# Patient Record
Sex: FEMALE | Race: BLACK OR AFRICAN AMERICAN | NOT HISPANIC OR LATINO | Employment: FULL TIME | URBAN - METROPOLITAN AREA
[De-identification: names, ages, dates, MRNs, and addresses within clinical notes are randomized per-mention and may not be internally consistent; named-entity substitution may affect disease eponyms.]

---

## 2021-06-26 ENCOUNTER — HOSPITAL ENCOUNTER (EMERGENCY)
Facility: HOSPITAL | Age: 31
Discharge: HOME | End: 2021-06-26
Attending: EMERGENCY MEDICINE | Admitting: EMERGENCY MEDICINE

## 2021-06-26 VITALS
OXYGEN SATURATION: 99 % | HEART RATE: 94 BPM | DIASTOLIC BLOOD PRESSURE: 101 MMHG | SYSTOLIC BLOOD PRESSURE: 146 MMHG | RESPIRATION RATE: 16 BRPM | TEMPERATURE: 97.9 F

## 2021-06-26 DIAGNOSIS — L73.2 HYDRADENITIS: ICD-10-CM

## 2021-06-26 DIAGNOSIS — S31.809A WOUND OF GLUTEAL CLEFT, UNSPECIFIED LATERALITY, INITIAL ENCOUNTER: Primary | ICD-10-CM

## 2021-06-26 PROCEDURE — 99282 EMERGENCY DEPT VISIT SF MDM: CPT

## 2021-06-26 RX ORDER — MEDROXYPROGESTERONE ACETATE 150 MG/ML
150 INJECTION, SUSPENSION INTRAMUSCULAR
COMMUNITY
End: 2022-07-08

## 2021-08-10 ENCOUNTER — HOSPITAL ENCOUNTER (EMERGENCY)
Facility: HOSPITAL | Age: 31
Discharge: HOME | End: 2021-08-10
Attending: EMERGENCY MEDICINE | Admitting: EMERGENCY MEDICINE

## 2021-08-10 VITALS
SYSTOLIC BLOOD PRESSURE: 155 MMHG | OXYGEN SATURATION: 99 % | WEIGHT: 150 LBS | TEMPERATURE: 97.1 F | RESPIRATION RATE: 16 BRPM | HEART RATE: 97 BPM | DIASTOLIC BLOOD PRESSURE: 107 MMHG | BODY MASS INDEX: 25.61 KG/M2 | HEIGHT: 64 IN

## 2021-08-10 DIAGNOSIS — Z20.2 EXPOSURE TO SYPHILIS: Primary | ICD-10-CM

## 2021-08-10 DIAGNOSIS — L98.9 SKIN LESION: ICD-10-CM

## 2021-08-10 LAB — RPR SER QL: NORMAL

## 2021-08-10 PROCEDURE — 96372 THER/PROPH/DIAG INJ SC/IM: CPT

## 2021-08-10 PROCEDURE — 25010000002 PENICILLIN G BENZATHINE PER 2400000 UNITS: Performed by: PHYSICIAN ASSISTANT

## 2021-08-10 PROCEDURE — 87491 CHLMYD TRACH DNA AMP PROBE: CPT | Performed by: PHYSICIAN ASSISTANT

## 2021-08-10 PROCEDURE — 86592 SYPHILIS TEST NON-TREP QUAL: CPT | Performed by: PHYSICIAN ASSISTANT

## 2021-08-10 PROCEDURE — 87591 N.GONORRHOEAE DNA AMP PROB: CPT | Performed by: PHYSICIAN ASSISTANT

## 2021-08-10 PROCEDURE — 36415 COLL VENOUS BLD VENIPUNCTURE: CPT | Performed by: PHYSICIAN ASSISTANT

## 2021-08-10 PROCEDURE — 99283 EMERGENCY DEPT VISIT LOW MDM: CPT

## 2021-08-10 RX ADMIN — PENICILLIN G BENZATHINE 2.4 MILLION UNITS: 2400000 INJECTION, SUSPENSION INTRAMUSCULAR at 13:34

## 2021-08-10 NOTE — ED TRIAGE NOTES
Pt wants to be tested for std.  She does not have discharge but reports she has a sore    Patient was placed in face mask during first look triage.  Patient was wearing a face mask throughout encounter.  I wore personal protective equipment throughout the encounter.  Hand hygiene was performed before and after patient encounter.

## 2021-08-10 NOTE — ED PROVIDER NOTES
EMERGENCY DEPARTMENT ENCOUNTER    Room Number:  A01/01  Date of encounter:  8/10/2021  PCP: Provider, No Known  Historian: Patient      I used full protective equipment while examining this patient.  This includes face mask, gloves and protective eyewear.  I washed my hands before entering the room and immediately upon leaving the room      HPI:  Chief Complaint: STD exposure, skin lesion  A complete HPI/ROS/PMH/PSH/SH/FH are unobtainable due to: Nothing    Context: Uri Mirza is a 31 y.o. female who presents to the ED c/o several day history of skin lesions to gluteal cleft.  Patient was also told by her recent sexual partner that he was diagnosed with syphilis.  Patient is a female who has had a sex change, is now a male.  She denies any fevers, chills, abdominal pain.  She denies any vaginal discharge or lesions.  Patient denies any trauma to the area.  She denies any discharge, swelling.  She describes a mild, irritation-like pain to the superior gluteal cleft.    Review of Medical Records  I reviewed ER visit from 6/26/2021.  Patient seen for skin lesions of gluteal cleft.    PAST MEDICAL HISTORY  Active Ambulatory Problems     Diagnosis Date Noted   • No Active Ambulatory Problems     Resolved Ambulatory Problems     Diagnosis Date Noted   • No Resolved Ambulatory Problems     No Additional Past Medical History         PAST SURGICAL HISTORY  No past surgical history on file.      FAMILY HISTORY  No family history on file.      SOCIAL HISTORY  Social History     Socioeconomic History   • Marital status: Single     Spouse name: Not on file   • Number of children: Not on file   • Years of education: Not on file   • Highest education level: Not on file         ALLERGIES  Banana and Mushroom        REVIEW OF SYSTEMS  All systems reviewed and negative except for those discussed in HPI.       PHYSICAL EXAM    I have reviewed the triage vital signs and nursing notes.    ED Triage Vitals   Temp Heart Rate Resp BP  SpO2   08/10/21 1145 08/10/21 1145 08/10/21 1145 08/10/21 1146 08/10/21 1145   97.1 °F (36.2 °C) 110 16 (!) 155/107 99 %      Temp src Heart Rate Source Patient Position BP Location FiO2 (%)   08/10/21 1145 08/10/21 1145 -- -- --   Tympanic Monitor          Physical Exam  GENERAL: Alert, oriented, not distressed  HENT: head atraumatic, no nuchal rigidity  EYES: no scleral icterus, EOMI  CV: regular rhythm, regular rate, no murmur  RESPIRATORY: normal effort, CTA  ABDOMEN: soft, nontender  MUSCULOSKELETAL: no deformity, FROM, no calf swelling or tenderness  NEURO: alert, moves all extremities, follows commands  SKIN: 2 discrete areas of approximate 1.5 cm areas of skin erosion to the superior gluteal cleft.  No underlying induration, swelling, discharge.        LAB RESULTS  Recent Results (from the past 24 hour(s))   RPR    Collection Time: 08/10/21 12:04 PM    Specimen: Blood   Result Value Ref Range    RPR Non-Reactive Non-Reactive       Ordered the above labs and independently reviewed the results.    MEDICATIONS GIVEN IN ER    Medications   penicillin G benzathine (BICILLIN-LA) injection 2.4 Million Units (2.4 Million Units Intramuscular Given 8/10/21 1334)         PROGRESS, DATA ANALYSIS, CONSULTS, AND MEDICAL DECISION MAKING    All labs have been independently reviewed by me.  All radiology studies have been reviewed by me and discussed with radiologist dictating the report.   EKG's independently viewed and interpreted by me.  Discussion below represents my analysis of pertinent findings related to patient's condition, differential diagnosis, treatment plan and final disposition.    I have discussed case with Dr. Pina, emergency room physician.  He has performed his own bedside examination and agrees with treatment plan.    ED Course as of Aug 10 2026   Tue Aug 10, 2021   1308 Patient presents with several day history of sore to gluteal cleft.  Patient had recent exposure to syphilis.  She denies any vaginal  discharge, lesions.  Plan to treat for syphilis and obtain RPR.    [EE]      ED Course User Index  [EE] Osman Modi PA       AS OF 20:26 EDT VITALS:    BP - (!) 155/107  HR - 97  TEMP - 97.1 °F (36.2 °C) (Tympanic)  O2 SATS - 99%        DIAGNOSIS  Final diagnoses:   Exposure to syphilis   Skin lesion         DISPOSITION  Discharged           Osman Modi PA  08/10/21 2027

## 2021-08-10 NOTE — ED NOTES
Pt states that her significant other was diagnosed with syphilis     Kaelyn Patel, RN  08/10/21 [id number]

## 2021-08-10 NOTE — ED PROVIDER NOTES
Pt presents to the ED c/o  exposure to a STI.  Patient found out yesterday that her significant other has syphilis.  Patient is noted to nontender areas in her gluteal cleft for the past several days.  She states that they are itchy but they do not hurt.  She denies fever, chills, rash or abdominal pain.  She denies nausea or vomiting.  She denies urinary symptoms.     On exam,   Her heart is regular rate and rhythm without murmur.  Her lungs are clear to auscultation bilaterally.  Her abdomen is normoactive bowel sounds, soft, nontender nondistended.  With a female chaperone present, her rectal exam reveals 2 approximate 1.5 cm areas of skin erosion with granulation tissue.  There is no discharge and they are nontender to palpation.     Plan: I suspect that patient has primary syphilis with a chancre noted.  I agree with going ahead and testing for syphilis and treating the infection with penicillin.      Patient was placed in face mask in first look. Patient was wearing facemask when I entered the room and throughout our encounter. I wore full protective equipment throughout this patient encounter including a face mask, eye shield and gloves. Hand hygiene was performed before donning protective equipment and after removal when leaving the room.       Attestation:  The CHASIDY and I have discussed this patient's history, physical exam, and treatment plan.  I have reviewed the documentation and personally had a face to face interaction with the patient. I affirm the documentation and agree with the treatment and plan.  The attached note describes my personal findings.            Manoj Pina MD  08/10/21 0586

## 2021-08-11 LAB
C TRACH RRNA SPEC QL NAA+PROBE: NEGATIVE
N GONORRHOEA RRNA SPEC QL NAA+PROBE: NEGATIVE

## 2022-01-03 ENCOUNTER — HOSPITAL ENCOUNTER (EMERGENCY)
Facility: HOSPITAL | Age: 32
Discharge: LEFT BEFORE DISCHARGE | End: 2022-01-04

## 2022-01-03 LAB
ALBUMIN SERPL-MCNC: 3.7 G/DL (ref 3.5–5.2)
ALBUMIN/GLOB SERPL: 0.9 G/DL
ALP SERPL-CCNC: 50 U/L (ref 39–117)
ALT SERPL W P-5'-P-CCNC: 17 U/L (ref 1–33)
ANION GAP SERPL CALCULATED.3IONS-SCNC: 7.7 MMOL/L (ref 5–15)
AST SERPL-CCNC: 20 U/L (ref 1–32)
BASOPHILS # BLD AUTO: 0.02 10*3/MM3 (ref 0–0.2)
BASOPHILS NFR BLD AUTO: 0.5 % (ref 0–1.5)
BILIRUB SERPL-MCNC: <0.2 MG/DL (ref 0–1.2)
BILIRUB UR QL STRIP: NEGATIVE
BUN SERPL-MCNC: 10 MG/DL (ref 6–20)
BUN/CREAT SERPL: 14.5 (ref 7–25)
CALCIUM SPEC-SCNC: 8.9 MG/DL (ref 8.6–10.5)
CHLORIDE SERPL-SCNC: 105 MMOL/L (ref 98–107)
CLARITY UR: CLEAR
CO2 SERPL-SCNC: 28.3 MMOL/L (ref 22–29)
COLOR UR: YELLOW
CREAT SERPL-MCNC: 0.69 MG/DL (ref 0.57–1)
DEPRECATED RDW RBC AUTO: 38 FL (ref 37–54)
EOSINOPHIL # BLD AUTO: 0.41 10*3/MM3 (ref 0–0.4)
EOSINOPHIL NFR BLD AUTO: 10.1 % (ref 0.3–6.2)
ERYTHROCYTE [DISTWIDTH] IN BLOOD BY AUTOMATED COUNT: 13 % (ref 12.3–15.4)
GFR SERPL CREATININE-BSD FRML MDRD: 120 ML/MIN/1.73
GLOBULIN UR ELPH-MCNC: 4.3 GM/DL
GLUCOSE SERPL-MCNC: 112 MG/DL (ref 65–99)
GLUCOSE UR STRIP-MCNC: NEGATIVE MG/DL
HCG SERPL QL: NEGATIVE
HCT VFR BLD AUTO: 34.4 % (ref 34–46.6)
HGB BLD-MCNC: 11.3 G/DL (ref 12–15.9)
HGB UR QL STRIP.AUTO: NEGATIVE
HOLD SPECIMEN: NORMAL
IMM GRANULOCYTES # BLD AUTO: 0.02 10*3/MM3 (ref 0–0.05)
IMM GRANULOCYTES NFR BLD AUTO: 0.5 % (ref 0–0.5)
KETONES UR QL STRIP: NEGATIVE
LEUKOCYTE ESTERASE UR QL STRIP.AUTO: NEGATIVE
LIPASE SERPL-CCNC: 20 U/L (ref 13–60)
LYMPHOCYTES # BLD AUTO: 1.26 10*3/MM3 (ref 0.7–3.1)
LYMPHOCYTES NFR BLD AUTO: 31.2 % (ref 19.6–45.3)
MCH RBC QN AUTO: 26.7 PG (ref 26.6–33)
MCHC RBC AUTO-ENTMCNC: 32.8 G/DL (ref 31.5–35.7)
MCV RBC AUTO: 81.3 FL (ref 79–97)
MONOCYTES # BLD AUTO: 0.56 10*3/MM3 (ref 0.1–0.9)
MONOCYTES NFR BLD AUTO: 13.9 % (ref 5–12)
NEUTROPHILS NFR BLD AUTO: 1.77 10*3/MM3 (ref 1.7–7)
NEUTROPHILS NFR BLD AUTO: 43.8 % (ref 42.7–76)
NITRITE UR QL STRIP: NEGATIVE
NRBC BLD AUTO-RTO: 0 /100 WBC (ref 0–0.2)
PH UR STRIP.AUTO: 6 [PH] (ref 5–8)
PLATELET # BLD AUTO: 170 10*3/MM3 (ref 140–450)
PMV BLD AUTO: 11.1 FL (ref 6–12)
POTASSIUM SERPL-SCNC: 3.7 MMOL/L (ref 3.5–5.2)
PROT SERPL-MCNC: 8 G/DL (ref 6–8.5)
PROT UR QL STRIP: ABNORMAL
RBC # BLD AUTO: 4.23 10*6/MM3 (ref 3.77–5.28)
SODIUM SERPL-SCNC: 141 MMOL/L (ref 136–145)
SP GR UR STRIP: 1.02 (ref 1–1.03)
UROBILINOGEN UR QL STRIP: ABNORMAL
WBC NRBC COR # BLD: 4.04 10*3/MM3 (ref 3.4–10.8)
WHOLE BLOOD HOLD SPECIMEN: NORMAL
WHOLE BLOOD HOLD SPECIMEN: NORMAL

## 2022-01-03 PROCEDURE — 83690 ASSAY OF LIPASE: CPT

## 2022-01-03 PROCEDURE — 81003 URINALYSIS AUTO W/O SCOPE: CPT

## 2022-01-03 PROCEDURE — 84703 CHORIONIC GONADOTROPIN ASSAY: CPT

## 2022-01-03 PROCEDURE — 99211 OFF/OP EST MAY X REQ PHY/QHP: CPT

## 2022-01-03 PROCEDURE — 80053 COMPREHEN METABOLIC PANEL: CPT

## 2022-01-03 PROCEDURE — 85025 COMPLETE CBC W/AUTO DIFF WBC: CPT

## 2022-01-03 RX ORDER — SODIUM CHLORIDE 0.9 % (FLUSH) 0.9 %
10 SYRINGE (ML) INJECTION AS NEEDED
Status: DISCONTINUED | OUTPATIENT
Start: 2022-01-03 | End: 2022-01-04 | Stop reason: HOSPADM

## 2022-01-04 VITALS
TEMPERATURE: 97.7 F | SYSTOLIC BLOOD PRESSURE: 165 MMHG | DIASTOLIC BLOOD PRESSURE: 106 MMHG | HEIGHT: 64 IN | OXYGEN SATURATION: 100 % | BODY MASS INDEX: 25.75 KG/M2 | RESPIRATION RATE: 15 BRPM | HEART RATE: 93 BPM

## 2022-01-04 NOTE — ED TRIAGE NOTES
"Pt arrived to ER via pv with c/o \"cramping\". Pt states that she cramps every morning after she eats and it has been going on for 1 week.  Pt states she feels full before finishing her meal.  Pt denies seeing a doctor for this issue.   Pt also thinks she may have a hemorrhoid and wants to be seen for that as well.    Pt in mask & this RN in appropriate PPE during care.   "

## 2022-01-04 NOTE — ED NOTES
Pt reassessed, states the cramping has decreased but the pain/itching in her annal area has remained the same. Pt does not appear to be in acute distress.      Moises Verduzco, RN  01/04/22 9144

## 2022-07-08 ENCOUNTER — APPOINTMENT (OUTPATIENT)
Dept: GENERAL RADIOLOGY | Facility: HOSPITAL | Age: 32
End: 2022-07-08

## 2022-07-08 ENCOUNTER — HOSPITAL ENCOUNTER (INPATIENT)
Facility: HOSPITAL | Age: 32
LOS: 2 days | Discharge: HOME | End: 2022-07-11
Attending: EMERGENCY MEDICINE | Admitting: HOSPITALIST

## 2022-07-08 DIAGNOSIS — R91.8 LUNG INFILTRATE: ICD-10-CM

## 2022-07-08 DIAGNOSIS — R09.02 HYPOXIA: Primary | ICD-10-CM

## 2022-07-08 LAB
ALBUMIN SERPL-MCNC: 3.7 G/DL (ref 3.5–5.2)
ALBUMIN/GLOB SERPL: 0.7 G/DL
ALP SERPL-CCNC: 50 U/L (ref 39–117)
ALT SERPL W P-5'-P-CCNC: 12 U/L (ref 1–33)
ANION GAP SERPL CALCULATED.3IONS-SCNC: 13 MMOL/L (ref 5–15)
AST SERPL-CCNC: 30 U/L (ref 1–32)
BASOPHILS # BLD AUTO: 0.01 10*3/MM3 (ref 0–0.2)
BASOPHILS NFR BLD AUTO: 0.2 % (ref 0–1.5)
BILIRUB SERPL-MCNC: 0.2 MG/DL (ref 0–1.2)
BUN SERPL-MCNC: 12 MG/DL (ref 6–20)
BUN/CREAT SERPL: 14 (ref 7–25)
CALCIUM SPEC-SCNC: 8.7 MG/DL (ref 8.6–10.5)
CHLORIDE SERPL-SCNC: 100 MMOL/L (ref 98–107)
CO2 SERPL-SCNC: 24 MMOL/L (ref 22–29)
CREAT SERPL-MCNC: 0.86 MG/DL (ref 0.57–1)
D DIMER PPP FEU-MCNC: 1.7 MCGFEU/ML (ref 0–0.49)
DEPRECATED RDW RBC AUTO: 37.6 FL (ref 37–54)
EGFRCR SERPLBLD CKD-EPI 2021: 92.2 ML/MIN/1.73
EOSINOPHIL # BLD AUTO: 0.13 10*3/MM3 (ref 0–0.4)
EOSINOPHIL NFR BLD AUTO: 2.2 % (ref 0.3–6.2)
ERYTHROCYTE [DISTWIDTH] IN BLOOD BY AUTOMATED COUNT: 12.9 % (ref 12.3–15.4)
GLOBULIN UR ELPH-MCNC: 5.1 GM/DL
GLUCOSE SERPL-MCNC: 133 MG/DL (ref 65–99)
HCT VFR BLD AUTO: 32.9 % (ref 34–46.6)
HGB BLD-MCNC: 10.7 G/DL (ref 12–15.9)
IMM GRANULOCYTES # BLD AUTO: 0.07 10*3/MM3 (ref 0–0.05)
IMM GRANULOCYTES NFR BLD AUTO: 1.2 % (ref 0–0.5)
LYMPHOCYTES # BLD AUTO: 0.92 10*3/MM3 (ref 0.7–3.1)
LYMPHOCYTES NFR BLD AUTO: 15.5 % (ref 19.6–45.3)
MCH RBC QN AUTO: 26.5 PG (ref 26.6–33)
MCHC RBC AUTO-ENTMCNC: 32.5 G/DL (ref 31.5–35.7)
MCV RBC AUTO: 81.4 FL (ref 79–97)
MONOCYTES # BLD AUTO: 0.6 10*3/MM3 (ref 0.1–0.9)
MONOCYTES NFR BLD AUTO: 10.1 % (ref 5–12)
NEUTROPHILS NFR BLD AUTO: 4.21 10*3/MM3 (ref 1.7–7)
NEUTROPHILS NFR BLD AUTO: 70.8 % (ref 42.7–76)
NRBC BLD AUTO-RTO: 0 /100 WBC (ref 0–0.2)
PLATELET # BLD AUTO: 269 10*3/MM3 (ref 140–450)
PMV BLD AUTO: 10.2 FL (ref 6–12)
POTASSIUM SERPL-SCNC: 3.8 MMOL/L (ref 3.5–5.2)
PROT SERPL-MCNC: 8.8 G/DL (ref 6–8.5)
RBC # BLD AUTO: 4.04 10*6/MM3 (ref 3.77–5.28)
SODIUM SERPL-SCNC: 137 MMOL/L (ref 136–145)
WBC NRBC COR # BLD: 5.94 10*3/MM3 (ref 3.4–10.8)

## 2022-07-08 PROCEDURE — 93010 ELECTROCARDIOGRAM REPORT: CPT | Performed by: INTERNAL MEDICINE

## 2022-07-08 PROCEDURE — 71046 X-RAY EXAM CHEST 2 VIEWS: CPT

## 2022-07-08 PROCEDURE — 93005 ELECTROCARDIOGRAM TRACING: CPT

## 2022-07-08 PROCEDURE — 85025 COMPLETE CBC W/AUTO DIFF WBC: CPT

## 2022-07-08 PROCEDURE — 99285 EMERGENCY DEPT VISIT HI MDM: CPT

## 2022-07-08 PROCEDURE — U0003 INFECTIOUS AGENT DETECTION BY NUCLEIC ACID (DNA OR RNA); SEVERE ACUTE RESPIRATORY SYNDROME CORONAVIRUS 2 (SARS-COV-2) (CORONAVIRUS DISEASE [COVID-19]), AMPLIFIED PROBE TECHNIQUE, MAKING USE OF HIGH THROUGHPUT TECHNOLOGIES AS DESCRIBED BY CMS-2020-01-R: HCPCS | Performed by: EMERGENCY MEDICINE

## 2022-07-08 PROCEDURE — 84484 ASSAY OF TROPONIN QUANT: CPT | Performed by: EMERGENCY MEDICINE

## 2022-07-08 PROCEDURE — 36415 COLL VENOUS BLD VENIPUNCTURE: CPT

## 2022-07-08 PROCEDURE — 85379 FIBRIN DEGRADATION QUANT: CPT | Performed by: EMERGENCY MEDICINE

## 2022-07-08 PROCEDURE — 80053 COMPREHEN METABOLIC PANEL: CPT

## 2022-07-08 PROCEDURE — 93005 ELECTROCARDIOGRAM TRACING: CPT | Performed by: EMERGENCY MEDICINE

## 2022-07-09 ENCOUNTER — APPOINTMENT (OUTPATIENT)
Dept: CARDIOLOGY | Facility: HOSPITAL | Age: 32
End: 2022-07-09

## 2022-07-09 ENCOUNTER — APPOINTMENT (OUTPATIENT)
Dept: CT IMAGING | Facility: HOSPITAL | Age: 32
End: 2022-07-09

## 2022-07-09 PROBLEM — R79.89 ELEVATED D-DIMER: Status: ACTIVE | Noted: 2022-07-09

## 2022-07-09 PROBLEM — R06.02 SHORTNESS OF BREATH: Status: ACTIVE | Noted: 2022-07-09

## 2022-07-09 PROBLEM — R09.02 HYPOXIA: Status: ACTIVE | Noted: 2022-07-09

## 2022-07-09 LAB
ANION GAP SERPL CALCULATED.3IONS-SCNC: 16 MMOL/L (ref 5–15)
B PARAPERT DNA SPEC QL NAA+PROBE: NOT DETECTED
B PERT DNA SPEC QL NAA+PROBE: NOT DETECTED
BASOPHILS # BLD AUTO: 0.01 10*3/MM3 (ref 0–0.2)
BASOPHILS NFR BLD AUTO: 0.2 % (ref 0–1.5)
BH CV LOWER VASCULAR LEFT COMMON FEMORAL AUGMENT: NORMAL
BH CV LOWER VASCULAR LEFT COMMON FEMORAL COMPETENT: NORMAL
BH CV LOWER VASCULAR LEFT COMMON FEMORAL COMPRESS: NORMAL
BH CV LOWER VASCULAR LEFT COMMON FEMORAL PHASIC: NORMAL
BH CV LOWER VASCULAR LEFT COMMON FEMORAL SPONT: NORMAL
BH CV LOWER VASCULAR LEFT DISTAL FEMORAL COMPRESS: NORMAL
BH CV LOWER VASCULAR LEFT GASTRONEMIUS COMPRESS: NORMAL
BH CV LOWER VASCULAR LEFT GREATER SAPH AK COMPRESS: NORMAL
BH CV LOWER VASCULAR LEFT GREATER SAPH BK COMPRESS: NORMAL
BH CV LOWER VASCULAR LEFT LESSER SAPH COMPRESS: NORMAL
BH CV LOWER VASCULAR LEFT MID FEMORAL AUGMENT: NORMAL
BH CV LOWER VASCULAR LEFT MID FEMORAL COMPETENT: NORMAL
BH CV LOWER VASCULAR LEFT MID FEMORAL COMPRESS: NORMAL
BH CV LOWER VASCULAR LEFT MID FEMORAL PHASIC: NORMAL
BH CV LOWER VASCULAR LEFT MID FEMORAL SPONT: NORMAL
BH CV LOWER VASCULAR LEFT PERONEAL COMPRESS: NORMAL
BH CV LOWER VASCULAR LEFT POPLITEAL AUGMENT: NORMAL
BH CV LOWER VASCULAR LEFT POPLITEAL COMPETENT: NORMAL
BH CV LOWER VASCULAR LEFT POPLITEAL COMPRESS: NORMAL
BH CV LOWER VASCULAR LEFT POPLITEAL PHASIC: NORMAL
BH CV LOWER VASCULAR LEFT POPLITEAL SPONT: NORMAL
BH CV LOWER VASCULAR LEFT POSTERIOR TIBIAL COMPRESS: NORMAL
BH CV LOWER VASCULAR LEFT PROFUNDA FEMORAL COMPRESS: NORMAL
BH CV LOWER VASCULAR LEFT PROXIMAL FEMORAL COMPRESS: NORMAL
BH CV LOWER VASCULAR LEFT SAPHENOFEMORAL JUNCTION COMPRESS: NORMAL
BH CV LOWER VASCULAR RIGHT COMMON FEMORAL AUGMENT: NORMAL
BH CV LOWER VASCULAR RIGHT COMMON FEMORAL COMPETENT: NORMAL
BH CV LOWER VASCULAR RIGHT COMMON FEMORAL COMPRESS: NORMAL
BH CV LOWER VASCULAR RIGHT COMMON FEMORAL PHASIC: NORMAL
BH CV LOWER VASCULAR RIGHT COMMON FEMORAL SPONT: NORMAL
BH CV LOWER VASCULAR RIGHT DISTAL FEMORAL COMPRESS: NORMAL
BH CV LOWER VASCULAR RIGHT GASTRONEMIUS COMPRESS: NORMAL
BH CV LOWER VASCULAR RIGHT GREATER SAPH AK COMPRESS: NORMAL
BH CV LOWER VASCULAR RIGHT GREATER SAPH BK COMPRESS: NORMAL
BH CV LOWER VASCULAR RIGHT LESSER SAPH COMPRESS: NORMAL
BH CV LOWER VASCULAR RIGHT MID FEMORAL AUGMENT: NORMAL
BH CV LOWER VASCULAR RIGHT MID FEMORAL COMPETENT: NORMAL
BH CV LOWER VASCULAR RIGHT MID FEMORAL COMPRESS: NORMAL
BH CV LOWER VASCULAR RIGHT MID FEMORAL PHASIC: NORMAL
BH CV LOWER VASCULAR RIGHT MID FEMORAL SPONT: NORMAL
BH CV LOWER VASCULAR RIGHT PERONEAL COMPRESS: NORMAL
BH CV LOWER VASCULAR RIGHT POPLITEAL AUGMENT: NORMAL
BH CV LOWER VASCULAR RIGHT POPLITEAL COMPETENT: NORMAL
BH CV LOWER VASCULAR RIGHT POPLITEAL COMPRESS: NORMAL
BH CV LOWER VASCULAR RIGHT POPLITEAL PHASIC: NORMAL
BH CV LOWER VASCULAR RIGHT POPLITEAL SPONT: NORMAL
BH CV LOWER VASCULAR RIGHT POSTERIOR TIBIAL COMPRESS: NORMAL
BH CV LOWER VASCULAR RIGHT PROFUNDA FEMORAL COMPRESS: NORMAL
BH CV LOWER VASCULAR RIGHT PROXIMAL FEMORAL COMPRESS: NORMAL
BH CV LOWER VASCULAR RIGHT SAPHENOFEMORAL JUNCTION COMPRESS: NORMAL
BUN SERPL-MCNC: 9 MG/DL (ref 6–20)
BUN/CREAT SERPL: 12.7 (ref 7–25)
C PNEUM DNA NPH QL NAA+NON-PROBE: NOT DETECTED
CALCIUM SPEC-SCNC: 8.7 MG/DL (ref 8.6–10.5)
CHLORIDE SERPL-SCNC: 99 MMOL/L (ref 98–107)
CO2 SERPL-SCNC: 21 MMOL/L (ref 22–29)
CREAT SERPL-MCNC: 0.71 MG/DL (ref 0.57–1)
D-LACTATE SERPL-SCNC: 1.7 MMOL/L (ref 0.5–2)
DEPRECATED RDW RBC AUTO: 35.9 FL (ref 37–54)
EGFRCR SERPLBLD CKD-EPI 2021: 116 ML/MIN/1.73
EOSINOPHIL # BLD AUTO: 0.01 10*3/MM3 (ref 0–0.4)
EOSINOPHIL NFR BLD AUTO: 0.2 % (ref 0.3–6.2)
ERYTHROCYTE [DISTWIDTH] IN BLOOD BY AUTOMATED COUNT: 13 % (ref 12.3–15.4)
FLUAV SUBTYP SPEC NAA+PROBE: NOT DETECTED
FLUBV RNA ISLT QL NAA+PROBE: NOT DETECTED
GLUCOSE SERPL-MCNC: 165 MG/DL (ref 65–99)
HADV DNA SPEC NAA+PROBE: NOT DETECTED
HCOV 229E RNA SPEC QL NAA+PROBE: NOT DETECTED
HCOV HKU1 RNA SPEC QL NAA+PROBE: NOT DETECTED
HCOV NL63 RNA SPEC QL NAA+PROBE: NOT DETECTED
HCOV OC43 RNA SPEC QL NAA+PROBE: NOT DETECTED
HCT VFR BLD AUTO: 34.3 % (ref 34–46.6)
HGB BLD-MCNC: 11.4 G/DL (ref 12–15.9)
HMPV RNA NPH QL NAA+NON-PROBE: NOT DETECTED
HPIV1 RNA ISLT QL NAA+PROBE: NOT DETECTED
HPIV2 RNA SPEC QL NAA+PROBE: NOT DETECTED
HPIV3 RNA NPH QL NAA+PROBE: NOT DETECTED
HPIV4 P GENE NPH QL NAA+PROBE: NOT DETECTED
IMM GRANULOCYTES # BLD AUTO: 0.06 10*3/MM3 (ref 0–0.05)
IMM GRANULOCYTES NFR BLD AUTO: 0.9 % (ref 0–0.5)
LYMPHOCYTES # BLD AUTO: 0.64 10*3/MM3 (ref 0.7–3.1)
LYMPHOCYTES NFR BLD AUTO: 9.8 % (ref 19.6–45.3)
M PNEUMO IGG SER IA-ACNC: NOT DETECTED
MAXIMAL PREDICTED HEART RATE: 188 BPM
MCH RBC QN AUTO: 26.3 PG (ref 26.6–33)
MCHC RBC AUTO-ENTMCNC: 33.2 G/DL (ref 31.5–35.7)
MCV RBC AUTO: 79.2 FL (ref 79–97)
MONOCYTES # BLD AUTO: 0.21 10*3/MM3 (ref 0.1–0.9)
MONOCYTES NFR BLD AUTO: 3.2 % (ref 5–12)
NEUTROPHILS NFR BLD AUTO: 5.59 10*3/MM3 (ref 1.7–7)
NEUTROPHILS NFR BLD AUTO: 85.7 % (ref 42.7–76)
NRBC BLD AUTO-RTO: 0 /100 WBC (ref 0–0.2)
PLATELET # BLD AUTO: 330 10*3/MM3 (ref 140–450)
PMV BLD AUTO: 10.6 FL (ref 6–12)
POTASSIUM SERPL-SCNC: 4.4 MMOL/L (ref 3.5–5.2)
PROCALCITONIN SERPL-MCNC: 0.07 NG/ML (ref 0–0.25)
QT INTERVAL: 368 MS
RBC # BLD AUTO: 4.33 10*6/MM3 (ref 3.77–5.28)
RHINOVIRUS RNA SPEC NAA+PROBE: NOT DETECTED
RSV RNA NPH QL NAA+NON-PROBE: NOT DETECTED
SARS-COV-2 RNA NPH QL NAA+NON-PROBE: NOT DETECTED
SARS-COV-2 RNA RESP QL NAA+PROBE: NOT DETECTED
SODIUM SERPL-SCNC: 136 MMOL/L (ref 136–145)
STRESS TARGET HR: 160 BPM
TROPONIN T SERPL-MCNC: <0.01 NG/ML (ref 0–0.03)
WBC NRBC COR # BLD: 6.52 10*3/MM3 (ref 3.4–10.8)

## 2022-07-09 PROCEDURE — 83605 ASSAY OF LACTIC ACID: CPT | Performed by: NURSE PRACTITIONER

## 2022-07-09 PROCEDURE — 0 IOPAMIDOL PER 1 ML: Performed by: EMERGENCY MEDICINE

## 2022-07-09 PROCEDURE — 25010000002 DEXAMETHASONE PER 1 MG: Performed by: EMERGENCY MEDICINE

## 2022-07-09 PROCEDURE — 25010000002 CEFTRIAXONE PER 250 MG: Performed by: HOSPITALIST

## 2022-07-09 PROCEDURE — 71275 CT ANGIOGRAPHY CHEST: CPT

## 2022-07-09 PROCEDURE — 93970 EXTREMITY STUDY: CPT

## 2022-07-09 PROCEDURE — 63710000001 DEXAMETHASONE PER 0.25 MG: Performed by: HOSPITALIST

## 2022-07-09 PROCEDURE — 80048 BASIC METABOLIC PNL TOTAL CA: CPT | Performed by: NURSE PRACTITIONER

## 2022-07-09 PROCEDURE — 0202U NFCT DS 22 TRGT SARS-COV-2: CPT | Performed by: NURSE PRACTITIONER

## 2022-07-09 PROCEDURE — 84145 PROCALCITONIN (PCT): CPT | Performed by: HOSPITALIST

## 2022-07-09 PROCEDURE — 85025 COMPLETE CBC W/AUTO DIFF WBC: CPT | Performed by: NURSE PRACTITIONER

## 2022-07-09 PROCEDURE — 25010000002 ENOXAPARIN PER 10 MG: Performed by: NURSE PRACTITIONER

## 2022-07-09 RX ORDER — SODIUM CHLORIDE 0.9 % (FLUSH) 0.9 %
10 SYRINGE (ML) INJECTION AS NEEDED
Status: DISCONTINUED | OUTPATIENT
Start: 2022-07-09 | End: 2022-07-11 | Stop reason: HOSPADM

## 2022-07-09 RX ORDER — SODIUM CHLORIDE 0.9 % (FLUSH) 0.9 %
10 SYRINGE (ML) INJECTION EVERY 12 HOURS SCHEDULED
Status: DISCONTINUED | OUTPATIENT
Start: 2022-07-09 | End: 2022-07-11 | Stop reason: HOSPADM

## 2022-07-09 RX ORDER — ENOXAPARIN SODIUM 100 MG/ML
40 INJECTION SUBCUTANEOUS DAILY
Status: DISCONTINUED | OUTPATIENT
Start: 2022-07-09 | End: 2022-07-11 | Stop reason: HOSPADM

## 2022-07-09 RX ORDER — ACETAMINOPHEN 650 MG/1
650 SUPPOSITORY RECTAL EVERY 4 HOURS PRN
Status: DISCONTINUED | OUTPATIENT
Start: 2022-07-09 | End: 2022-07-11 | Stop reason: HOSPADM

## 2022-07-09 RX ORDER — ONDANSETRON 2 MG/ML
4 INJECTION INTRAMUSCULAR; INTRAVENOUS EVERY 6 HOURS PRN
Status: DISCONTINUED | OUTPATIENT
Start: 2022-07-09 | End: 2022-07-11 | Stop reason: HOSPADM

## 2022-07-09 RX ORDER — DEXAMETHASONE 6 MG/1
6 TABLET ORAL
Status: DISCONTINUED | OUTPATIENT
Start: 2022-07-09 | End: 2022-07-10

## 2022-07-09 RX ORDER — ACETAMINOPHEN 325 MG/1
650 TABLET ORAL EVERY 4 HOURS PRN
Status: DISCONTINUED | OUTPATIENT
Start: 2022-07-09 | End: 2022-07-11 | Stop reason: HOSPADM

## 2022-07-09 RX ORDER — DEXAMETHASONE SODIUM PHOSPHATE 10 MG/ML
6 INJECTION INTRAMUSCULAR; INTRAVENOUS ONCE
Status: COMPLETED | OUTPATIENT
Start: 2022-07-09 | End: 2022-07-09

## 2022-07-09 RX ORDER — ACETAMINOPHEN 160 MG/5ML
650 SOLUTION ORAL EVERY 4 HOURS PRN
Status: DISCONTINUED | OUTPATIENT
Start: 2022-07-09 | End: 2022-07-11 | Stop reason: HOSPADM

## 2022-07-09 RX ORDER — NITROGLYCERIN 0.4 MG/1
0.4 TABLET SUBLINGUAL
Status: DISCONTINUED | OUTPATIENT
Start: 2022-07-09 | End: 2022-07-11 | Stop reason: HOSPADM

## 2022-07-09 RX ADMIN — ENOXAPARIN SODIUM 40 MG: 100 INJECTION SUBCUTANEOUS at 04:09

## 2022-07-09 RX ADMIN — CEFTRIAXONE 1 G: 1 INJECTION, POWDER, FOR SOLUTION INTRAMUSCULAR; INTRAVENOUS at 11:16

## 2022-07-09 RX ADMIN — Medication 10 ML: at 22:22

## 2022-07-09 RX ADMIN — DEXAMETHASONE 6 MG: 6 TABLET ORAL at 11:15

## 2022-07-09 RX ADMIN — DEXAMETHASONE SODIUM PHOSPHATE 6 MG: 10 INJECTION, SOLUTION INTRAMUSCULAR; INTRAVENOUS at 01:47

## 2022-07-09 RX ADMIN — IOPAMIDOL 95 ML: 755 INJECTION, SOLUTION INTRAVENOUS at 00:38

## 2022-07-09 NOTE — ED NOTES
Nursing report ED to floor  Star Yuki  32 y.o.  female    HPI :   Chief Complaint   Patient presents with   • Shortness of Breath       Admitting doctor:   John Hamilton MD    Admitting diagnosis:   The primary encounter diagnosis was Hypoxia. A diagnosis of Lung infiltrate was also pertinent to this visit.    Code status:   Current Code Status     Date Active Code Status Order ID Comments User Context       7/9/2022 0325 CPR (Attempt to Resuscitate) [id number]  Sandra Blue, APRN ED     Advance Care Planning Activity      Questions for Current Code Status     Question Answer    Code Status (Patient has no pulse and is not breathing) CPR (Attempt to Resuscitate)    Medical Interventions (Patient has pulse or is breathing) Full Support          Allergies:   Banana and Mushroom    Intake and Output    Intake/Output Summary (Last 24 hours) at 7/9/2022 1455  Last data filed at 7/9/2022 1130  Gross per 24 hour   Intake 100 ml   Output --   Net 100 ml       Weight:       07/09/22  0614   Weight: 66.2 kg (146 lb)       Most recent vitals:   Vitals:    07/09/22 1301 07/09/22 1331 07/09/22 1335 07/09/22 1337   BP: 116/77 117/86     BP Location:       Patient Position:       Pulse: 105 95 99 94   Resp:       Temp:       TempSrc:       SpO2: 94%  96% 96%   Weight:       Height:           Active LDAs/IV Access:   Lines, Drains & Airways     Active LDAs     Name Placement date Placement time Site Days    Peripheral IV 07/08/22 2255 Right Antecubital 07/08/22 2255  Antecubital  less than 1    Peripheral IV 07/09/22 0648 Left Antecubital 07/09/22  0648  Antecubital  less than 1                Labs (abnormal labs have a star):   Labs Reviewed   COMPREHENSIVE METABOLIC PANEL - Abnormal; Notable for the following components:       Result Value    Glucose 133 (*)     Total Protein 8.8 (*)     All other components within normal limits    Narrative:     GFR Normal >60  Chronic Kidney Disease <60  Kidney Failure <15      CBC WITH AUTO DIFFERENTIAL - Abnormal; Notable for the following components:    Hemoglobin 10.7 (*)     Hematocrit 32.9 (*)     MCH 26.5 (*)     Lymphocyte % 15.5 (*)     Immature Grans % 1.2 (*)     Immature Grans, Absolute 0.07 (*)     All other components within normal limits   D-DIMER, QUANTITATIVE - Abnormal; Notable for the following components:    D-Dimer, Quantitative 1.70 (*)     All other components within normal limits    Narrative:     The Stago D-Dimer test used in conjunction with a clinical pretest probability (PTP) assessment model, has been approved by the FDA to rule out the presence of venous thromboembolism (VTE) in outpatients suspected of deep venous thrombosis (DVT) or pulmonary embolism (PE). The cut-off for negative predictive value is <0.50 MCGFEU/mL.   BASIC METABOLIC PANEL - Abnormal; Notable for the following components:    Glucose 165 (*)     CO2 21.0 (*)     Anion Gap 16.0 (*)     All other components within normal limits    Narrative:     GFR Normal >60  Chronic Kidney Disease <60  Kidney Failure <15     CBC WITH AUTO DIFFERENTIAL - Abnormal; Notable for the following components:    Hemoglobin 11.4 (*)     MCH 26.3 (*)     RDW-SD 35.9 (*)     Neutrophil % 85.7 (*)     Lymphocyte % 9.8 (*)     Monocyte % 3.2 (*)     Eosinophil % 0.2 (*)     Immature Grans % 0.9 (*)     Lymphocytes, Absolute 0.64 (*)     Immature Grans, Absolute 0.06 (*)     All other components within normal limits   COVID-19,[facility] IN-HOUSE CEPHEID/SHAHEEN, NP SWAB IN TRANSPORT MEDIA 8-12 HR TAT - Normal    Narrative:     Fact sheet for providers: https://www.fda.gov/media/377098/download     Fact sheet for patients: https://www.fda.gov/media/090172/download   RESPIRATORY PANEL PCR W/ COVID-19 (SARS-COV-2) [facility] IN-HOUSE, NP SWAB IN UTM/VTP, 3-4 HR TAT - Normal    Narrative:     In the setting of a positive respiratory panel with a viral infection PLUS a negative procalcitonin without other  "underlying concern for bacterial infection, consider observing off antibiotics or discontinuation of antibiotics and continue supportive care. If the respiratory panel is positive for atypical bacterial infection (Bordetella pertussis, Chlamydophila pneumoniae, or Mycoplasma pneumoniae), consider antibiotic de-escalation to target atypical bacterial infection.   TROPONIN (IN-HOUSE) - Normal    Narrative:     Troponin T Reference Range:  <= 0.03 ng/mL-   Negative for AMI  >0.03 ng/mL-     Abnormal for myocardial necrosis.  Clinicians would have to utilize clinical acumen, EKG, Troponin and serial changes to determine if it is an Acute Myocardial Infarction or myocardial injury due to an underlying chronic condition.       Results may be falsely decreased if patient taking Biotin.     LACTIC ACID, PLASMA - Normal   PROCALCITONIN - Normal    Narrative:     As a Marker for Sepsis (Non-Neonates):    1. <0.5 ng/mL represents a low risk of severe sepsis and/or septic shock.  2. >2 ng/mL represents a high risk of severe sepsis and/or septic shock.    As a Marker for Lower Respiratory Tract Infections that require antibiotic therapy:    PCT on Admission    Antibiotic Therapy       6-12 Hrs later    >0.5                Strongly Recommended  >0.25 - <0.5        Recommended   0.1 - 0.25          Discouraged              Remeasure/reassess PCT  <0.1                Strongly Discouraged     Remeasure/reassess PCT    As 28 day mortality risk marker: \"Change in Procalcitonin Result\" (>80% or <=80%) if Day 0 (or Day 1) and Day 4 values are available. Refer to http://www.[facility]-pct-calculator.com    Change in PCT <=80%  A decrease of PCT levels below or equal to 80% defines a positive change in PCT test result representing a higher risk for 28-day all-cause mortality of patients diagnosed with severe sepsis for septic shock.    Change in PCT >80%  A decrease of PCT levels of more than 80% defines a negative change in PCT result " representing a lower risk for 28-day all-cause mortality of patients diagnosed with severe sepsis or septic shock.      COVID PRE-OP / PRE-PROCEDURE SCREENING ORDER (NO ISOLATION)    Narrative:     The following orders were created for panel order COVID PRE-OP / PRE-PROCEDURE SCREENING ORDER (NO ISOLATION) - Swab, Nasopharynx.  Procedure                               Abnormality         Status                     ---------                               -----------         ------                     COVID-19,[facility] IN-HOUSE...[[id number]]  Normal              Final result                 Please view results for these tests on the individual orders.   RESPIRATORY CULTURE   URINE DRUG SCREEN   CBC AND DIFFERENTIAL    Narrative:     The following orders were created for panel order CBC & Differential.  Procedure                               Abnormality         Status                     ---------                               -----------         ------                     CBC Auto Differential[[id number]]        Abnormal            Final result                 Please view results for these tests on the individual orders.       EKG:   ECG 12 Lead   Preliminary Result   HEART RATE= 87  bpm   RR Interval= 692  ms   MI Interval= 173  ms   P Horizontal Axis= 6  deg   P Front Axis= 52  deg   QRSD Interval= 85  ms   QT Interval= 368  ms   QRS Axis= 15  deg   T Wave Axis= 34  deg   - NORMAL ECG -   Sinus rhythm   Electronically Signed By:    Date and Time of Study: 2022-07-08 20:48:09          Meds given in ED:   Medications   sodium chloride 0.9 % flush 10 mL (10 mL Intravenous Not Given 7/9/22 3648)   sodium chloride 0.9 % flush 10 mL (has no administration in time range)   Enoxaparin Sodium (LOVENOX) syringe 40 mg (40 mg Subcutaneous Given 7/9/22 0551)   nitroglycerin (NITROSTAT) SL tablet 0.4 mg (has no administration in time range)   acetaminophen (TYLENOL) tablet 650 mg (has no administration in time range)     Or    acetaminophen (TYLENOL) 160 MG/5ML solution 650 mg (has no administration in time range)     Or   acetaminophen (TYLENOL) suppository 650 mg (has no administration in time range)   ondansetron (ZOFRAN) injection 4 mg (has no administration in time range)   dexamethasone (DECADRON) tablet 6 mg (6 mg Oral Given 7/9/22 1115)   cefTRIAXone (ROCEPHIN) 1 g in sodium chloride 0.9 % 100 mL IVPB-VTB (0 g Intravenous Stopped 7/9/22 1130)   iopamidol (ISOVUE-370) 76 % injection 100 mL (95 mL Intravenous Given 7/9/22 0038)   dexamethasone (DECADRON) injection 6 mg (6 mg Intravenous Given 7/9/22 0147)       Imaging results:  CT Angiogram Chest    Result Date: 7/9/2022   1. The patient has bilateral pulmonary opacities, with relative peripheral sparing. The appearance is nonspecific. Both infectious and inflammatory etiologies would be in the differential, including vaping associated lung disease, given history.  Radiation dose reduction techniques were utilized, including automated exposure control and exposure modulation based on body size.  This report was finalized on 7/9/2022 12:54 AM by Dr. Patricia Rogers M.D.        Ambulatory status:   Partial assist    Social issues:   Social History     Socioeconomic History   • Marital status: Single   Tobacco Use   • Smoking status: Never Smoker   • Smokeless tobacco: Never Used   Vaping Use   • Vaping Use: Some days   • Devices: started vapping last month, has stopped since SOA started   Substance and Sexual Activity   • Alcohol use: Yes     Comment: socc   • Drug use: Never   • Sexual activity: Defer       NIH Stroke Scale:        Nursing report ED to floor:

## 2022-07-09 NOTE — PROGRESS NOTES
Patient sent for bilateral lower extremity venous duplex. Scan completed and preliminary results of negative for DVT called to Thalia Anderson RN in the ER as No ER physician assigned Sandra Kuhn APRN is who ordered.

## 2022-07-09 NOTE — CONSULTS
Group: [facility]         CONSULT NOTE    Patient Identification:  Uri Mirza  32 y.o.  female  1990  [id number]            Requesting physician: Dr Hamilton    Reason for Consultation: Pulmonary infiltrates    CC: Shortness of breath    History of Present Illness:  33-year-old female who has been coughing and short of breath for about 3 to 4 days.  Exacerbated by exertion.  Alleviated by rest.  She denies much mucus.  Has had some chills, but denies hemoptysis.  She does have some chest soreness when she takes a deep breath.  Tells me she is no longer a smoker.  She never smoked cigarettes but she started smoking electronic vaporizer from home during the pandemic while she was working from home.  She says she quit smoking electronic vaporizer 1 month ago.  I discussed the case with Dr. Hamilton, hospitalist.    Review of Systems   Constitutional: Positive for fever. Negative for diaphoresis.   HENT: Negative for ear discharge and sore throat.    Eyes: Negative for pain and visual disturbance.   Respiratory: Positive for cough and shortness of breath.    Cardiovascular: Positive for chest pain. Negative for leg swelling.   Gastrointestinal: Negative for abdominal pain and diarrhea.   Endocrine: Negative for cold intolerance and polyuria.   Genitourinary: Negative for dysuria and hematuria.   Musculoskeletal: Negative for joint swelling and myalgias.   Skin: Negative for rash and wound.   Neurological: Negative for speech difficulty and numbness.   Hematological: Negative for adenopathy. Does not bruise/bleed easily.   Psychiatric/Behavioral: Negative for agitation and confusion.       Past Medical History:  History reviewed. No pertinent past medical history.    Past Surgical History:  History reviewed. No pertinent surgical history.     Home Meds:  Reviewed and reconciled    Allergies:  Allergies   Allergen Reactions   • Banana Itching   • Mushroom Provider Review Needed       Social History:  "  Social History     Socioeconomic History   • Marital status: Single   Tobacco Use   • Smoking status: Never Smoker   • Smokeless tobacco: Never Used   Vaping Use   • Vaping Use: Some days   • Devices: started vapping last month, has stopped since SOA started   Substance and Sexual Activity   • Alcohol use: Yes     Comment: soc   • Drug use: Never   • Sexual activity: Defer       Family History:  History reviewed. No pertinent family history.    Physical Exam:  /90   Pulse 103   Temp 99.6 °F (37.6 °C)   Resp 18   Ht 162.6 cm (64\")   Wt 66.2 kg (146 lb)   SpO2 95%   BMI 25.06 kg/m²  Body mass index is 25.06 kg/m². 95% 66.2 kg (146 lb)  Physical Exam  HENT:      Right Ear: External ear normal.      Left Ear: External ear normal.      Nose: Nose normal.   Eyes:      Conjunctiva/sclera: Conjunctivae normal.      Pupils: Pupils are equal, round, and reactive to light.   Neck:      Thyroid: No thyromegaly.      Vascular: No JVD.      Trachea: No tracheal deviation.   Cardiovascular:      Rate and Rhythm: Normal rate and regular rhythm.      Heart sounds: Normal heart sounds. No murmur heard.  Pulmonary:      Effort: Pulmonary effort is normal.      Breath sounds: Normal breath sounds.   Abdominal:      Palpations: Abdomen is soft.      Tenderness: There is no abdominal tenderness. There is no rebound.      Comments: Cannot palpate liver or spleen enlargement   Musculoskeletal:         General: No deformity. Normal range of motion.      Cervical back: Neck supple. No rigidity.   Skin:     General: Skin is warm.      Findings: No rash.      Comments: No palpable nodules   Neurological:      General: No focal deficit present.      Mental Status: She is alert and oriented to person, place, and time.      Cranial Nerves: No cranial nerve deficit.      Motor: No weakness.   Psychiatric:         Mood and Affect: Mood normal.         Thought Content: Thought content normal.         LABS:  COVID19   Date Value Ref " Range Status   07/09/2022 Not Detected Not Detected - Ref. Range Final       Lab Results   Component Value Date    CALCIUM 8.7 07/09/2022     Results from last 7 days   Lab Units 07/09/22  0641 07/08/22 2017   SODIUM mmol/L 136 137   POTASSIUM mmol/L 4.4 3.8   CHLORIDE mmol/L 99 100   CO2 mmol/L 21.0* 24.0   BUN mg/dL 9 12   CREATININE mg/dL 0.71 0.86   GLUCOSE mg/dL 165* 133*   CALCIUM mg/dL 8.7 8.7   WBC 10*3/mm3 6.52 5.94   HEMOGLOBIN g/dL 11.4* 10.7*   PLATELETS 10*3/mm3 330 269   ALT (SGPT) U/L  --  12   AST (SGOT) U/L  --  30     Lab Results   Component Value Date    TROPONINT <0.010 07/08/2022     Results from last 7 days   Lab Units 07/08/22  2332   TROPONIN T ng/mL <0.010         Results from last 7 days   Lab Units 07/09/22  0649   LACTATE mmol/L 1.7         Results from last 7 days   Lab Units 07/09/22  0408   ADENOVIRUS DETECTION BY PCR  Not Detected   CORONAVIRUS 229E  Not Detected   CORONAVIRUS HKU1  Not Detected   CORONAVIRUS NL63  Not Detected   CORONAVIRUS OC43  Not Detected   HUMAN METAPNEUMOVIRUS  Not Detected   HUMAN RHINOVIRUS/ENTEROVIRUS  Not Detected   INFLUENZA B PCR  Not Detected   PARAINFLUENZA 1  Not Detected   PARAINFLUENZA VIRUS 2  Not Detected   PARAINFLUENZA VIRUS 3  Not Detected   PARAINFLUENZA VIRUS 4  Not Detected   BORDETELLA PERTUSSIS PCR  Not Detected   BORDETELLA PARAPERTUSSIS PCR  Not Detected   CHLAMYDOPHILA PNEUMONIAE PCR  Not Detected   MYCOPLAMA PNEUMO PCR  Not Detected   RSV, PCR  Not Detected             No results found for: TSH  Estimated Creatinine Clearance: 106.5 mL/min (by C-G formula based on SCr of 0.71 mg/dL).         Imaging: I personally visualized the images of CT of the chest showing extensive bilateral diffuse parenchymal infiltrates and nodules.      Assessment:  Acute hypoxic respiratory failure  Diffuse interstitial lung disease  Anemia      Recommendations:  Her lungs are quite extensively involves bilaterally from top to bottom.  Etiology is currently  unclear.  The differential diagnosis is broad.  It is unclear whether this is hypersensitivity pneumonitis, infectious lung disease, vaporizer acute lung injury, sarcoidosis or some other esoteric type of interstitial lung disease.  Discussed with Dr. Hamilton.  Apparently she quit smoking a vaporizer electronic cigarettes 3 to 4 weeks ago.  I am not sure if her lung disease is going to spontaneously resolve if it is true that she quit smoking the electronic cigarettes such a long time ago.  I agree she would need corticosteroids.  At this time I suggest treating her for 6 to 8 weeks with 40 mg of prednisone daily, tapering by 5 mg every week until down to 0.  If this recurs in the future she will need open lung biopsy before we treat her again with steroids.  I am not sure why she has a slight metabolic acidosis and anemia.  Wean oxygen as tolerated.          Temo Diego MD  7/9/2022  12:05 EDT      Much of this encounter note is an electronic transcription/translation of spoken language to printed text using Dragon Software.

## 2022-07-09 NOTE — PLAN OF CARE
Problem: Adult Inpatient Plan of Care  Goal: Plan of Care Review  Outcome: Ongoing, Progressing  Flowsheets (Taken 7/9/2022 8907)  Progress: no change  Plan of Care Reviewed With: patient  Outcome Evaluation: Pt from ER today. Enhanced Covid Precautions maintained. Pt vitals stable on 2.5L O2. IV abx. Need urine and sputum  sample

## 2022-07-09 NOTE — ED PROVIDER NOTES
EMERGENCY DEPARTMENT ENCOUNTER    Room Number:  26/26  Date of encounter:  7/9/2022  PCP: Provider, No Known  Historian: Patient      HPI:  Chief Complaint: Dyspnea  A complete HPI/ROS/PMH/PSH/SH/FH are unobtainable due to: None    Context: Uri Mirza is a 32 y.o. female who presents to the ED c/o dyspnea for 4 days.  Worse with exertion.  Reports faint cough with a small amount of mucus.  Also reports some chest tightness and pleuritic chest pain.  Denies recent sick contacts.  Also states he has been having some intermittent fevers.      PAST MEDICAL HISTORY  Active Ambulatory Problems     Diagnosis Date Noted   • No Active Ambulatory Problems     Resolved Ambulatory Problems     Diagnosis Date Noted   • No Resolved Ambulatory Problems     No Additional Past Medical History         PAST SURGICAL HISTORY  History reviewed. No pertinent surgical history.      FAMILY HISTORY  History reviewed. No pertinent family history.      SOCIAL HISTORY  Social History     Socioeconomic History   • Marital status: Single   Tobacco Use   • Smoking status: Never Smoker   • Smokeless tobacco: Never Used   Vaping Use   • Vaping Use: Some days   • Devices: started vapping last month, has stopped since SOA started   Substance and Sexual Activity   • Alcohol use: Yes     Comment: socc   • Drug use: Never   • Sexual activity: Defer         ALLERGIES  Banana and Mushroom        REVIEW OF SYSTEMS  Review of Systems     All systems reviewed and negative except for those discussed in HPI.       PHYSICAL EXAM    I have reviewed the triage vital signs and nursing notes.    ED Triage Vitals   Temp Heart Rate Resp BP SpO2   07/08/22 1807 07/08/22 1807 07/08/22 1807 07/08/22 2049 07/08/22 1807   98.9 °F (37.2 °C) 114 22 140/89 96 %      Temp src Heart Rate Source Patient Position BP Location FiO2 (%)   07/08/22 1807 07/08/22 2049 07/08/22 2049 07/08/22 2049 --   Tympanic Monitor Lying Left arm        Physical Exam  GENERAL: not  distressed  HENT: nares patent  EYES: no scleral icterus  CV: regular rhythm, regular rate  RESPIRATORY: normal effort, mild left coarse breath sounds  ABDOMEN: soft  MUSCULOSKELETAL: no deformity  NEURO: alert, moves all extremities, follows commands  SKIN: warm, dry        LAB RESULTS  Recent Results (from the past 24 hour(s))   Comprehensive Metabolic Panel    Collection Time: 07/08/22  8:17 PM    Specimen: Blood   Result Value Ref Range    Glucose 133 (H) 65 - 99 mg/dL    BUN 12 6 - 20 mg/dL    Creatinine 0.86 0.57 - 1.00 mg/dL    Sodium 137 136 - 145 mmol/L    Potassium 3.8 3.5 - 5.2 mmol/L    Chloride 100 98 - 107 mmol/L    CO2 24.0 22.0 - 29.0 mmol/L    Calcium 8.7 8.6 - 10.5 mg/dL    Total Protein 8.8 (H) 6.0 - 8.5 g/dL    Albumin 3.70 3.50 - 5.20 g/dL    ALT (SGPT) 12 1 - 33 U/L    AST (SGOT) 30 1 - 32 U/L    Alkaline Phosphatase 50 39 - 117 U/L    Total Bilirubin 0.2 0.0 - 1.2 mg/dL    Globulin 5.1 gm/dL    A/G Ratio 0.7 g/dL    BUN/Creatinine Ratio 14.0 7.0 - 25.0    Anion Gap 13.0 5.0 - 15.0 mmol/L    eGFR 92.2 >60.0 mL/min/1.73   CBC Auto Differential    Collection Time: 07/08/22  8:17 PM    Specimen: Blood   Result Value Ref Range    WBC 5.94 3.40 - 10.80 10*3/mm3    RBC 4.04 3.77 - 5.28 10*6/mm3    Hemoglobin 10.7 (L) 12.0 - 15.9 g/dL    Hematocrit 32.9 (L) 34.0 - 46.6 %    MCV 81.4 79.0 - 97.0 fL    MCH 26.5 (L) 26.6 - 33.0 pg    MCHC 32.5 31.5 - 35.7 g/dL    RDW 12.9 12.3 - 15.4 %    RDW-SD 37.6 37.0 - 54.0 fl    MPV 10.2 6.0 - 12.0 fL    Platelets 269 140 - 450 10*3/mm3    Neutrophil % 70.8 42.7 - 76.0 %    Lymphocyte % 15.5 (L) 19.6 - 45.3 %    Monocyte % 10.1 5.0 - 12.0 %    Eosinophil % 2.2 0.3 - 6.2 %    Basophil % 0.2 0.0 - 1.5 %    Immature Grans % 1.2 (H) 0.0 - 0.5 %    Neutrophils, Absolute 4.21 1.70 - 7.00 10*3/mm3    Lymphocytes, Absolute 0.92 0.70 - 3.10 10*3/mm3    Monocytes, Absolute 0.60 0.10 - 0.90 10*3/mm3    Eosinophils, Absolute 0.13 0.00 - 0.40 10*3/mm3    Basophils, Absolute  0.01 0.00 - 0.20 10*3/mm3    Immature Grans, Absolute 0.07 (H) 0.00 - 0.05 10*3/mm3    nRBC 0.0 0.0 - 0.2 /100 WBC   ECG 12 Lead    Collection Time: 07/08/22  8:48 PM   Result Value Ref Range    QT Interval 368 ms   COVID-19,[facility] IN-HOUSE CEPHEID/[facility] NP SWAB IN TRANSPORT MEDIA 8-12 HR TAT - Swab, Nasopharynx    Collection Time: 07/08/22 11:22 PM    Specimen: Nasopharynx; Swab   Result Value Ref Range    COVID19 Not Detected Not Detected - Ref. Range   D-dimer, Quantitative    Collection Time: 07/08/22 11:32 PM    Specimen: Blood   Result Value Ref Range    D-Dimer, Quantitative 1.70 (H) 0.00 - 0.49 MCGFEU/mL   Troponin    Collection Time: 07/08/22 11:32 PM    Specimen: Blood   Result Value Ref Range    Troponin T <0.010 0.000 - 0.030 ng/mL   Respiratory Panel PCR w/COVID-19(SARS-CoV-2) [facility] In-House, NP Swab in UTM/VTM, 3-4 HR TAT - Swab, Nasopharynx    Collection Time: 07/09/22  4:08 AM    Specimen: Nasopharynx; Swab   Result Value Ref Range    ADENOVIRUS, PCR Not Detected Not Detected    Coronavirus 229E Not Detected Not Detected    Coronavirus HKU1 Not Detected Not Detected    Coronavirus NL63 Not Detected Not Detected    Coronavirus OC43 Not Detected Not Detected    COVID19 Not Detected Not Detected - Ref. Range    Human Metapneumovirus Not Detected Not Detected    Human Rhinovirus/Enterovirus Not Detected Not Detected    Influenza A PCR Not Detected Not Detected    Influenza B PCR Not Detected Not Detected    Parainfluenza Virus 1 Not Detected Not Detected    Parainfluenza Virus 2 Not Detected Not Detected    Parainfluenza Virus 3 Not Detected Not Detected    Parainfluenza Virus 4 Not Detected Not Detected    RSV, PCR Not Detected Not Detected    Bordetella pertussis pcr Not Detected Not Detected    Bordetella parapertussis PCR Not Detected Not Detected    Chlamydophila pneumoniae PCR Not Detected Not Detected    Mycoplasma pneumo by PCR Not Detected Not Detected       Ordered the  above labs and independently reviewed the results.        RADIOLOGY  XR Chest 2 View    Result Date: 7/8/2022  CHEST TWO VIEWS  HISTORY: Shortness of breath.  COMPARISON: None.  FINDINGS: PA and lateral views of the chest demonstrate the heart to be within normal limits in size. There is interstitial prominence involving the perihilar regions bilaterally as well as involving the lungs centrally and at the right lung base. There is no evidence of consolidation or effusion. A pneumonic infiltrate or COVID cannot be excluded. Mild dextroscoliosis of the thoracic spine is noted.  This report was finalized on 7/8/2022 7:44 PM by Dr. John Wood M.D.      CT Angiogram Chest    Result Date: 7/9/2022  CT ANGIOGRAM OF THE CHEST  HISTORY: Shortness of air and pleuritic chest pain  COMPARISON: 07/08/2022  TECHNIQUE: Axial CT imaging was obtained through the thorax. IV contrast was administered. Three-D reformatted images were obtained.  FINDINGS: No acute pulmonary thromboembolus is seen. Thoracic aorta is normal in caliber. There is no evidence of dissection. The thyroid gland, trachea, and esophagus appear unremarkable. The patient does have a small pericardial effusion. Mediastinal lymph nodes do not appear pathologically enlarged. Bilateral pulmonary opacities are seen. There is peripheral sparing. Infiltrates are more significant on the right. Images through the upper abdomen do not demonstrate any acute abnormalities. Thoracic scoliosis, with convexity to the right is again seen.       1. The patient has bilateral pulmonary opacities, with relative peripheral sparing. The appearance is nonspecific. Both infectious and inflammatory etiologies would be in the differential, including vaping associated lung disease, given history.  Radiation dose reduction techniques were utilized, including automated exposure control and exposure modulation based on body size.  This report was finalized on 7/9/2022 12:54 AM by Dr. Gomez  KELLY Rogers ordered the above noted radiological studies. Reviewed by me and discussed with radiologist.  See dictation for official radiology interpretation.      PROCEDURES    Critical Care  Performed by: Napoleon Vera MD  Authorized by: John Hamilton MD     Critical care provider statement:     Critical care time (minutes):  45    Critical care was necessary to treat or prevent imminent or life-threatening deterioration of the following conditions:  Respiratory failure    Critical care was time spent personally by me on the following activities:  Development of treatment plan with patient or surrogate, ordering and performing treatments and interventions, ordering and review of laboratory studies, ordering and review of radiographic studies, pulse oximetry, re-evaluation of patient's condition, examination of patient and evaluation of patient's response to treatment          MEDICATIONS GIVEN IN ER    Medications   sodium chloride 0.9 % flush 10 mL (has no administration in time range)   sodium chloride 0.9 % flush 10 mL (has no administration in time range)   Enoxaparin Sodium (LOVENOX) syringe 40 mg (40 mg Subcutaneous Given 7/9/22 0409)   nitroglycerin (NITROSTAT) SL tablet 0.4 mg (has no administration in time range)   acetaminophen (TYLENOL) tablet 650 mg (has no administration in time range)     Or   acetaminophen (TYLENOL) 160 MG/5ML solution 650 mg (has no administration in time range)     Or   acetaminophen (TYLENOL) suppository 650 mg (has no administration in time range)   ondansetron (ZOFRAN) injection 4 mg (has no administration in time range)   iopamidol (ISOVUE-370) 76 % injection 100 mL (95 mL Intravenous Given 7/9/22 0038)   dexamethasone (DECADRON) injection 6 mg (6 mg Intravenous Given 7/9/22 0147)         PROGRESS, DATA ANALYSIS, CONSULTS, AND MEDICAL DECISION MAKING    All labs have been independently reviewed by me.  All radiology studies have been reviewed by  me and discussed with radiologist dictating the report.   EKG's independently viewed and interpreted by me.  Discussion below represents my analysis of pertinent findings related to patient's condition, differential diagnosis, treatment plan and final disposition.        ED Course as of 07/09/22 0606 Fri Jul 08, 2022   2326 EKG          EKG time: 2048  Rhythm/Rate: Sinus rhythm rate 87  P waves and MT: Normal  QRS, axis: Normal  ST and T waves: Within normal limits    Interpreted Contemporaneously by me, independently viewed [TJ]   Sat Jul 09, 2022   0123 With ambulation off O2 patient desats to the 70s. [TJ]   0214 Patient will maintain COVID precautions despite negative COVID test. [TJ]   0606 D-Dimer, Quant(!): 1.70 [TJ]   0606 COVID19: Not Detected [TJ]   0606 WBC: 5.94 [TJ]      ED Course User Index  [TJ] Napoleon Vera MD           PPE: The patient wore a surgical mask throughout the entire patient encounter. I wore an N95.    AS OF 06:06 EDT VITALS:    BP - 160/97  HR - 115  TEMP - 98.3 °F (36.8 °C) (Oral)  O2 SATS - 97%        DIAGNOSIS  Final diagnoses:   Hypoxia   Lung infiltrate         DISPOSITION  Admit           Napoleon Vera MD  07/09/22 0601       Napoleon Vera MD  07/09/22 0606

## 2022-07-09 NOTE — H&P
Name: Uri Mirza ADMIT: 2022   : 1990  PCP: Provider, No Known    MRN: [id number] LOS: 0 days   AGE/SEX: 32 y.o. female  ROOM:      Chief Complaint   Patient presents with   • Shortness of Breath       Subjective   Patient is a 32 y.o. female who presents to [facility] with the above chief complaint.  Prior to 4 days ago the patient was her normal healthy self denies any prior issues or complaints.  She does not take any medications other than occasional Tylenol.  Her only past medical issue was back in college she was drinking heavily at one point and did have some alcoholic hepatitis and pancreatitis but does not drink currently.  About 4 days ago she noticed at work that she was becoming increasingly short of breath especially with minimal exertion.  At work she is required to walk between stations where she works in a [employer] and noticed that over the last 4 days has become increasingly taxing to make that journey.  She been increasingly short of breath and has noticed occasional productive cough that causes a lot of chest pain when she has it.  It is occasionally productive with some clear mucus.  She has had some chills and subjective fevers but has not checked her temperature at home.  She denies any ill contacts.  Denies known exposures at work.  She is not vaccinated against COVID-19 but denies any known exposures.  She does not wear PPE at work.  Prior to her current job she was working for [employer] at home and at that time she started using a vape pen.  She says she stopped doing this about 3 or 4 weeks ago and has not used since that time.  She denies any cigarette smoking.  She denies any history of pneumonias in the past.  She denies any trouble sleeping at night no paroxysmal nocturnal dyspnea.  Denies illicit drug use.  In the emergency room she was found to be pretty hypoxic requiring 4 L of oxygen to maintain a normal O2 saturation.  Her D-dimer was elevated  and there was some concern on chest x-ray for possible COVID-pneumonia.  A CT scan of the chest that was done and showed diffuse bilateral infiltrates with unclear etiology.  Because of hypoxia and unclear presentation she was admitted to our service for stabilization and evaluation.    History of Present Illness    History reviewed. No pertinent past medical history.  Past history pertinent for pancreatitis and alcoholic hepatitis more than 10 years ago  History reviewed. No pertinent surgical history.  History reviewed. No pertinent family history.  Social History     Tobacco Use   • Smoking status: Never Smoker   • Smokeless tobacco: Never Used   Vaping Use   • Vaping Use: Some days   • Devices: started vapping last month, has stopped since SOA started   Substance Use Topics   • Alcohol use: Yes     Comment: socc   • Drug use: Never     (Not in a hospital admission)    Allergies:  Banana and Mushroom    Review of Systems   Constitutional: Positive for chills. Negative for fatigue and fever.   HENT: Negative for congestion, rhinorrhea and sore throat.    Eyes: Negative for photophobia, redness and visual disturbance.   Respiratory: Positive for cough, shortness of breath and wheezing.    Cardiovascular: Positive for chest pain. Negative for palpitations and leg swelling.   Gastrointestinal: Negative for constipation, diarrhea, nausea and vomiting.   Endocrine: Negative for polyphagia and polyuria.   Genitourinary: Negative for difficulty urinating, dysuria and hematuria.   Musculoskeletal: Negative for arthralgias, neck pain and neck stiffness.   Skin: Negative for pallor and rash.   Neurological: Negative for dizziness, facial asymmetry and headaches.   Hematological: Negative for adenopathy. Does not bruise/bleed easily.   Psychiatric/Behavioral: Negative for agitation, behavioral problems and confusion.        Objective    Vital Signs  Temp:  [98.3 °F (36.8 °C)-99.6 °F (37.6 °C)] 99.6 °F (37.6 °C)  Heart Rate:   [] 93  Resp:  [16-22] 18  BP: (120-160)/() 127/89  SpO2:  [93 %-100 %] 98 %  on  Flow (L/min):  [4] 4;   Device (Oxygen Therapy): nasal cannula  Body mass index is 25.06 kg/m².    Physical Exam  Vitals and nursing note reviewed.   Constitutional:       Appearance: Normal appearance.   HENT:      Head: Normocephalic and atraumatic.   Cardiovascular:      Rate and Rhythm: Normal rate and regular rhythm.   Pulmonary:      Effort: Pulmonary effort is normal.      Breath sounds: Normal breath sounds.   Abdominal:      General: Bowel sounds are normal. There is no distension.      Palpations: Abdomen is soft.   Neurological:      General: No focal deficit present.      Mental Status: She is alert and oriented to person, place, and time.   Psychiatric:         Mood and Affect: Mood normal.         Behavior: Behavior normal.       Results Review:   I reviewed the patient's new clinical results.  Results from last 7 days   Lab Units 07/09/22 0641 07/08/22 2017   WBC 10*3/mm3 6.52 5.94   HEMOGLOBIN g/dL 11.4* 10.7*   PLATELETS 10*3/mm3 330 269     Results from last 7 days   Lab Units 07/09/22 0641 07/08/22 2017   SODIUM mmol/L 136 137   POTASSIUM mmol/L 4.4 3.8   CHLORIDE mmol/L 99 100   CO2 mmol/L 21.0* 24.0   BUN mg/dL 9 12   CREATININE mg/dL 0.71 0.86   GLUCOSE mg/dL 165* 133*   ALBUMIN g/dL  --  3.70   BILIRUBIN mg/dL  --  0.2   ALK PHOS U/L  --  50   AST (SGOT) U/L  --  30   ALT (SGPT) U/L  --  12   Estimated Creatinine Clearance: 106.5 mL/min (by C-G formula based on SCr of 0.71 mg/dL).  Results from last 7 days   Lab Units 07/08/22  2332   TROPONIN T ng/mL <0.010         Invalid input(s): LDLCALC    CT Angiogram Chest   Final Result       1. The patient has bilateral pulmonary opacities, with relative   peripheral sparing. The appearance is nonspecific. Both infectious and   inflammatory etiologies would be in the differential, including vaping   associated lung disease, given history.       Radiation  dose reduction techniques were utilized, including automated   exposure control and exposure modulation based on body size.       This report was finalized on 7/9/2022 12:54 AM by Dr. Patricia Rogers M.D.          XR Chest 2 View   Final Result        Assessment & Plan       Hypoxia    Shortness of breath    Elevated d-dimer      Assessment & Plan  This is a 32-year-old female in relative good health who presents to the hospital with shortness of breath and is found to have extensive bilateral pneumonia and hypoxia  -Source of this pneumonia remains unclear.  It could be a vape induced lung injury or other environmental exposure lung injury.  It is also possible this could represent an infectious etiology but seems less likely based on labs.  Discussed with pulmonary today and they agree with antibiotics and steroids presently.  We will continue to monitor closely.  -CT scan independently reviewed with the patient at the bedside  -As far as COVID-19 is concerned given the fact she is unvaccinated in such an unclear history we will keep her in isolation today recheck her COVID test in the morning if it is negative at that time I think we can safely come out of isolation.  -Lovenox for DVT prophylaxis  -Full code    --> Discussed with pulmonary appreciate their assistance with this patient.    I discussed the patients findings and my recommendations with patient, family and nursing staff.          John Hamilton MD  [facility]  07/09/22  10:40 EDT

## 2022-07-10 LAB
ALBUMIN SERPL-MCNC: 3 G/DL (ref 3.5–5.2)
AMPHET+METHAMPHET UR QL: NEGATIVE
ANION GAP SERPL CALCULATED.3IONS-SCNC: 10 MMOL/L (ref 5–15)
BARBITURATES UR QL SCN: NEGATIVE
BASOPHILS # BLD AUTO: 0.01 10*3/MM3 (ref 0–0.2)
BASOPHILS NFR BLD AUTO: 0.2 % (ref 0–1.5)
BENZODIAZ UR QL SCN: NEGATIVE
BUN SERPL-MCNC: 11 MG/DL (ref 6–20)
BUN/CREAT SERPL: 16.2 (ref 7–25)
CALCIUM SPEC-SCNC: 8.9 MG/DL (ref 8.6–10.5)
CANNABINOIDS SERPL QL: NEGATIVE
CHLORIDE SERPL-SCNC: 103 MMOL/L (ref 98–107)
CO2 SERPL-SCNC: 23 MMOL/L (ref 22–29)
COCAINE UR QL: NEGATIVE
CREAT SERPL-MCNC: 0.68 MG/DL (ref 0.57–1)
DEPRECATED RDW RBC AUTO: 38.6 FL (ref 37–54)
EGFRCR SERPLBLD CKD-EPI 2021: 118.8 ML/MIN/1.73
EOSINOPHIL # BLD AUTO: 0.02 10*3/MM3 (ref 0–0.4)
EOSINOPHIL NFR BLD AUTO: 0.3 % (ref 0.3–6.2)
ERYTHROCYTE [DISTWIDTH] IN BLOOD BY AUTOMATED COUNT: 13 % (ref 12.3–15.4)
GLUCOSE SERPL-MCNC: 117 MG/DL (ref 65–99)
HCT VFR BLD AUTO: 35 % (ref 34–46.6)
HGB BLD-MCNC: 11.3 G/DL (ref 12–15.9)
IMM GRANULOCYTES # BLD AUTO: 0.05 10*3/MM3 (ref 0–0.05)
IMM GRANULOCYTES NFR BLD AUTO: 0.8 % (ref 0–0.5)
LYMPHOCYTES # BLD AUTO: 0.92 10*3/MM3 (ref 0.7–3.1)
LYMPHOCYTES NFR BLD AUTO: 15 % (ref 19.6–45.3)
MCH RBC QN AUTO: 26.6 PG (ref 26.6–33)
MCHC RBC AUTO-ENTMCNC: 32.3 G/DL (ref 31.5–35.7)
MCV RBC AUTO: 82.4 FL (ref 79–97)
METHADONE UR QL SCN: NEGATIVE
MONOCYTES # BLD AUTO: 0.67 10*3/MM3 (ref 0.1–0.9)
MONOCYTES NFR BLD AUTO: 10.9 % (ref 5–12)
NEUTROPHILS NFR BLD AUTO: 4.46 10*3/MM3 (ref 1.7–7)
NEUTROPHILS NFR BLD AUTO: 72.8 % (ref 42.7–76)
NRBC BLD AUTO-RTO: 0 /100 WBC (ref 0–0.2)
OPIATES UR QL: NEGATIVE
OXYCODONE UR QL SCN: NEGATIVE
PHOSPHATE SERPL-MCNC: 4.1 MG/DL (ref 2.5–4.5)
PLATELET # BLD AUTO: 294 10*3/MM3 (ref 140–450)
PMV BLD AUTO: 10.2 FL (ref 6–12)
POTASSIUM SERPL-SCNC: 4.5 MMOL/L (ref 3.5–5.2)
RBC # BLD AUTO: 4.25 10*6/MM3 (ref 3.77–5.28)
SARS-COV-2 ORF1AB RESP QL NAA+PROBE: NOT DETECTED
SODIUM SERPL-SCNC: 136 MMOL/L (ref 136–145)
WBC NRBC COR # BLD: 6.13 10*3/MM3 (ref 3.4–10.8)

## 2022-07-10 PROCEDURE — 25010000002 CEFTRIAXONE PER 250 MG: Performed by: HOSPITALIST

## 2022-07-10 PROCEDURE — 25010000002 ENOXAPARIN PER 10 MG: Performed by: NURSE PRACTITIONER

## 2022-07-10 PROCEDURE — U0004 COV-19 TEST NON-CDC HGH THRU: HCPCS | Performed by: HOSPITALIST

## 2022-07-10 PROCEDURE — 80307 DRUG TEST PRSMV CHEM ANLYZR: CPT | Performed by: HOSPITALIST

## 2022-07-10 PROCEDURE — 80069 RENAL FUNCTION PANEL: CPT | Performed by: HOSPITALIST

## 2022-07-10 PROCEDURE — 85025 COMPLETE CBC W/AUTO DIFF WBC: CPT | Performed by: HOSPITALIST

## 2022-07-10 RX ORDER — PREDNISONE 20 MG/1
40 TABLET ORAL
Status: DISCONTINUED | OUTPATIENT
Start: 2022-07-11 | End: 2022-07-11 | Stop reason: HOSPADM

## 2022-07-10 RX ADMIN — Medication 10 ML: at 10:04

## 2022-07-10 RX ADMIN — Medication 10 ML: at 20:19

## 2022-07-10 RX ADMIN — ENOXAPARIN SODIUM 40 MG: 100 INJECTION SUBCUTANEOUS at 08:39

## 2022-07-10 RX ADMIN — CEFTRIAXONE 1 G: 1 INJECTION, POWDER, FOR SOLUTION INTRAMUSCULAR; INTRAVENOUS at 12:59

## 2022-07-10 RX ADMIN — DEXAMETHASONE 6 MG: 6 TABLET ORAL at 08:39

## 2022-07-10 NOTE — PLAN OF CARE
Problem: Adult Inpatient Plan of Care  Goal: Plan of Care Review  Outcome: Ongoing, Progressing  Flowsheets (Taken 7/10/2022 1705)  Progress: improving  Plan of Care Reviewed With: patient  Outcome Evaluation: Pt vitals stable. No c/o pain. Pt tolerated room air at rest but with activity required 2 L O2. Per pt she is feeling better. IV abx continued

## 2022-07-10 NOTE — PROGRESS NOTES
Dedicated to Hospital Care    [phone]   LOS: 1 day     Name: Uri Mirza  Age/Sex: 32 y.o. female  :  1990        PCP: Provider, No Known  Chief Complaint   Patient presents with   • Shortness of Breath      Subjective   Still feels short of breath with exertion but was able to get up and shower today following her shower O2 saturations dropped to 80% and she had difficulty recovering.  No other new issues or complaints no fevers or chills overnight.  General: No Fever or Chills, Cardiac: No Chest Pain or Palpitations, GI: No Nausea, Vomiting, or Diarrhea and Other: No bleeding    cefTRIAXone, 1 g, Intravenous, Q24H  dexamethasone, 6 mg, Oral, Daily With Breakfast  enoxaparin, 40 mg, Subcutaneous, Daily  sodium chloride, 10 mL, Intravenous, Q12H           Objective   Vital Signs  Temp:  [97.1 °F (36.2 °C)-98.3 °F (36.8 °C)] 97.1 °F (36.2 °C)  Heart Rate:  [] 97  Resp:  [18] 18  BP: (110-144)/(70-99) 110/77  Body mass index is 25.4 kg/m².    Intake/Output Summary (Last 24 hours) at 7/10/2022 0817  Last data filed at 7/10/2022 0003  Gross per 24 hour   Intake 340 ml   Output --   Net 340 ml       Physical Exam  Vitals and nursing note reviewed.   Constitutional:       Appearance: Normal appearance.   Cardiovascular:      Rate and Rhythm: Normal rate and regular rhythm.   Pulmonary:      Effort: Pulmonary effort is normal.      Breath sounds: Normal breath sounds.   Abdominal:      General: Bowel sounds are normal.      Palpations: Abdomen is soft.   Neurological:      General: No focal deficit present.      Mental Status: She is alert and oriented to person, place, and time.   Psychiatric:         Mood and Affect: Mood normal.         Behavior: Behavior normal.           Results Review:       I reviewed the patient's new clinical results.  Results from last 7 days   Lab Units 07/10/22  0449 22  0641 22   WBC 10*3/mm3 6.13 6.52 5.94   HEMOGLOBIN g/dL 11.3* 11.4* 10.7*   PLATELETS  10*3/mm3 294 330 269     Results from last 7 days   Lab Units 07/10/22  0449 07/09/22  0641 07/08/22 2017   SODIUM mmol/L 136 136 137   POTASSIUM mmol/L 4.5 4.4 3.8   CHLORIDE mmol/L 103 99 100   CO2 mmol/L 23.0 21.0* 24.0   BUN mg/dL 11 9 12   CREATININE mg/dL 0.68 0.71 0.86   CALCIUM mg/dL 8.9 8.7 8.7   PHOSPHORUS mg/dL 4.1  --   --    Estimated Creatinine Clearance: 111.9 mL/min (by C-G formula based on SCr of 0.68 mg/dL).      Assessment & Plan   Active Hospital Problems    Diagnosis  POA   • **Hypoxia [R09.02]  Yes   • Shortness of breath [R06.02]  Unknown   • Elevated d-dimer [R79.89]  Unknown      Resolved Hospital Problems   No resolved problems to display.       PLAN  Is a 32-year-old female with a rather insignificant past medical history who presents to the hospital with shortness of breath and is found to have rather significant inflammatory changes on her CT scan.  She is also found to be hypoxic on room air requiring up to 4 L on admission but has been titrated down to 2 L at this point.  -Responding well to steroids and antibiotics.  We will continue those for the time being.  -Repeat COVID testing is negative okay to discontinue enhanced isolation  -Discussed with pulmonary today.  Hopefully able to get off oxygen onto room air we will have her ambulate without oxygen tomorrow and see how she does.  I suspect she is likely going to need some oxygen at discharge.  -Discussed [facility] paperwork and will fill this out for the patient if they can bring it in tomorrow.  -Continue Lovenox for DVT prophylaxis  -Change steroids to prednisone and taper per pulmonary's directions      Disposition  Potentially home tomorrow depending on further progress and need for oxygen      John Hamilton MD  [facility]  07/10/22  08:17 EDT

## 2022-07-10 NOTE — PLAN OF CARE
Goal Outcome Evaluation:  Plan of Care Reviewed With: patient           Outcome Evaluation: Covid swab done. Verbalize feels better. Significant other at bedside. Rested well .

## 2022-07-10 NOTE — PROGRESS NOTES
Dr. KAMILLE Diego    [address]    7/10/2022    Patient ID:  Name:  Uri Mirza  MRN:  [id number]  1990  32 y.o.  female            CC/Reason for visit: Diffuse parenchymal lung disease    Interval hx: Feels a little bit better.  Says she can breathe a little better.  Still requiring 1 or 2 L of oxygen with exertion but at rest now saturating above 90%.  No hemoptysis    ROS: No chest pain, no abdominal pain    Vitals:  Vitals:    07/10/22 1049 07/10/22 1125 07/10/22 1134 07/10/22 1322   BP:    105/70   BP Location:    Left arm   Patient Position:    Lying   Pulse: 97  103 105   Resp:    18   Temp:    97.7 °F (36.5 °C)   TempSrc:    Oral   SpO2: 94% (!) 88% 97% 97%   Weight:       Height:               Body mass index is 25.4 kg/m².    Intake/Output Summary (Last 24 hours) at 7/10/2022 1349  Last data filed at 7/10/2022 1322  Gross per 24 hour   Intake 600 ml   Output --   Net 600 ml       Exam:  GEN:  No distress  Alert, oriented x 3.   LUNGS: Diminished breath sounds bilateral hear any crackles or wheezing bilat, no use of accessory muscles  CV:  Normal S1S2, without murmur, no edema  ABD:  Non tender, no enlarged liver or masses      Scheduled meds:  cefTRIAXone, 1 g, Intravenous, Q24H  dexamethasone, 6 mg, Oral, Daily With Breakfast  enoxaparin, 40 mg, Subcutaneous, Daily  sodium chloride, 10 mL, Intravenous, Q12H      IV meds:                           Data Review:   I reviewed the patient's medications and new clinical results.    COVID19   Date Value Ref Range Status   07/09/2022 Not Detected Not Detected - Ref. Range Final         Lab Results   Component Value Date    CALCIUM 8.9 07/10/2022    PHOS 4.1 07/10/2022     Results from last 7 days   Lab Units 07/10/22  0449 07/09/22  0641 07/08/22 2017   SODIUM mmol/L 136 136 137   POTASSIUM mmol/L 4.5 4.4 3.8   CHLORIDE mmol/L 103 99 100   CO2 mmol/L 23.0 21.0* 24.0   BUN mg/dL 11 9 12   CREATININE mg/dL 0.68 0.71 0.86   CALCIUM mg/dL 8.9  8.7 8.7   BILIRUBIN mg/dL  --   --  0.2   ALK PHOS U/L  --   --  50   ALT (SGPT) U/L  --   --  12   AST (SGOT) U/L  --   --  30   GLUCOSE mg/dL 117* 165* 133*   WBC 10*3/mm3 6.13 6.52 5.94   HEMOGLOBIN g/dL 11.3* 11.4* 10.7*   PLATELETS 10*3/mm3 294 330 269   PROCALCITONIN ng/mL  --  0.07  --              ASSESSMENT:   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  Get ambulatory oximetry tomorrow before discharge.  Discussed with Dr. Hamilton.  Etiology remains unclear but the treatment would be prednisone 40 mg daily tapering by 5 mg every week until done.  Unclear what caused this but she will need follow-up with us in the office.  I will send some vasculitis serology work-up.  If repeat procalcitonin is normal tomorrow and she did not spike fever or have leukocytosis we can stop Rocephin and send her home without antibiotics.      Temo Diego MD  7/10/2022

## 2022-07-11 ENCOUNTER — TELEPHONE (OUTPATIENT)
Dept: INTERNAL MEDICINE | Facility: CLINIC | Age: 32
End: 2022-07-11

## 2022-07-11 VITALS
WEIGHT: 148 LBS | BODY MASS INDEX: 25.27 KG/M2 | HEIGHT: 64 IN | OXYGEN SATURATION: 88 % | RESPIRATION RATE: 18 BRPM | HEART RATE: 102 BPM | SYSTOLIC BLOOD PRESSURE: 125 MMHG | TEMPERATURE: 97.4 F | DIASTOLIC BLOOD PRESSURE: 93 MMHG

## 2022-07-11 LAB
ALBUMIN SERPL-MCNC: 3.3 G/DL (ref 3.5–5.2)
ANION GAP SERPL CALCULATED.3IONS-SCNC: 11 MMOL/L (ref 5–15)
BASOPHILS # BLD AUTO: 0.01 10*3/MM3 (ref 0–0.2)
BASOPHILS NFR BLD AUTO: 0.2 % (ref 0–1.5)
BUN SERPL-MCNC: 13 MG/DL (ref 6–20)
BUN/CREAT SERPL: 21.7 (ref 7–25)
CALCIUM SPEC-SCNC: 8.8 MG/DL (ref 8.6–10.5)
CHLORIDE SERPL-SCNC: 101 MMOL/L (ref 98–107)
CO2 SERPL-SCNC: 25 MMOL/L (ref 22–29)
CREAT SERPL-MCNC: 0.6 MG/DL (ref 0.57–1)
DEPRECATED RDW RBC AUTO: 37.9 FL (ref 37–54)
EGFRCR SERPLBLD CKD-EPI 2021: 122.5 ML/MIN/1.73
EOSINOPHIL # BLD AUTO: 0.07 10*3/MM3 (ref 0–0.4)
EOSINOPHIL NFR BLD AUTO: 1.3 % (ref 0.3–6.2)
ERYTHROCYTE [DISTWIDTH] IN BLOOD BY AUTOMATED COUNT: 13 % (ref 12.3–15.4)
GLUCOSE SERPL-MCNC: 106 MG/DL (ref 65–99)
HCT VFR BLD AUTO: 32.9 % (ref 34–46.6)
HGB BLD-MCNC: 10.6 G/DL (ref 12–15.9)
IMM GRANULOCYTES # BLD AUTO: 0.05 10*3/MM3 (ref 0–0.05)
IMM GRANULOCYTES NFR BLD AUTO: 0.9 % (ref 0–0.5)
LYMPHOCYTES # BLD AUTO: 0.76 10*3/MM3 (ref 0.7–3.1)
LYMPHOCYTES NFR BLD AUTO: 14.3 % (ref 19.6–45.3)
MCH RBC QN AUTO: 26 PG (ref 26.6–33)
MCHC RBC AUTO-ENTMCNC: 32.2 G/DL (ref 31.5–35.7)
MCV RBC AUTO: 80.8 FL (ref 79–97)
MONOCYTES # BLD AUTO: 0.61 10*3/MM3 (ref 0.1–0.9)
MONOCYTES NFR BLD AUTO: 11.5 % (ref 5–12)
NEUTROPHILS NFR BLD AUTO: 3.82 10*3/MM3 (ref 1.7–7)
NEUTROPHILS NFR BLD AUTO: 71.8 % (ref 42.7–76)
NRBC BLD AUTO-RTO: 0.2 /100 WBC (ref 0–0.2)
PHOSPHATE SERPL-MCNC: 3.5 MG/DL (ref 2.5–4.5)
PLATELET # BLD AUTO: 329 10*3/MM3 (ref 140–450)
PMV BLD AUTO: 10.5 FL (ref 6–12)
POTASSIUM SERPL-SCNC: 4.2 MMOL/L (ref 3.5–5.2)
PROCALCITONIN SERPL-MCNC: 0.03 NG/ML (ref 0–0.25)
RBC # BLD AUTO: 4.07 10*6/MM3 (ref 3.77–5.28)
SODIUM SERPL-SCNC: 137 MMOL/L (ref 136–145)
URATE SERPL-MCNC: 6 MG/DL (ref 2.4–5.7)
WBC NRBC COR # BLD: 5.32 10*3/MM3 (ref 3.4–10.8)

## 2022-07-11 PROCEDURE — 94618 PULMONARY STRESS TESTING: CPT

## 2022-07-11 PROCEDURE — 86602 ANTINOMYCES ANTIBODY: CPT | Performed by: INTERNAL MEDICINE

## 2022-07-11 PROCEDURE — 86331 IMMUNODIFFUSION OUCHTERLONY: CPT | Performed by: INTERNAL MEDICINE

## 2022-07-11 PROCEDURE — 84145 PROCALCITONIN (PCT): CPT | Performed by: INTERNAL MEDICINE

## 2022-07-11 PROCEDURE — 80069 RENAL FUNCTION PANEL: CPT | Performed by: HOSPITALIST

## 2022-07-11 PROCEDURE — 86609 BACTERIUM ANTIBODY: CPT | Performed by: INTERNAL MEDICINE

## 2022-07-11 PROCEDURE — 86037 ANCA TITER EACH ANTIBODY: CPT | Performed by: INTERNAL MEDICINE

## 2022-07-11 PROCEDURE — 63710000001 PREDNISONE PER 1 MG: Performed by: HOSPITALIST

## 2022-07-11 PROCEDURE — 87449 NOS EACH ORGANISM AG IA: CPT | Performed by: INTERNAL MEDICINE

## 2022-07-11 PROCEDURE — 86671 FUNGUS NES ANTIBODY: CPT | Performed by: INTERNAL MEDICINE

## 2022-07-11 PROCEDURE — 85025 COMPLETE CBC W/AUTO DIFF WBC: CPT | Performed by: HOSPITALIST

## 2022-07-11 PROCEDURE — 86606 ASPERGILLUS ANTIBODY: CPT | Performed by: INTERNAL MEDICINE

## 2022-07-11 PROCEDURE — 84550 ASSAY OF BLOOD/URIC ACID: CPT | Performed by: HOSPITALIST

## 2022-07-11 PROCEDURE — 83516 IMMUNOASSAY NONANTIBODY: CPT | Performed by: INTERNAL MEDICINE

## 2022-07-11 RX ORDER — PREDNISONE 10 MG/1
TABLET ORAL
Qty: 101 TABLET | Refills: 0 | Status: ON HOLD | OUTPATIENT
Start: 2022-07-12 | End: 2022-08-13 | Stop reason: SDUPTHER

## 2022-07-11 RX ADMIN — PREDNISONE 40 MG: 20 TABLET ORAL at 07:54

## 2022-07-11 NOTE — PLAN OF CARE
Goal Outcome Evaluation:  Plan of Care Reviewed With: patient        Progress: improving  Outcome Evaluation: Pt still c/o a little bit of labored breathing but stable on 2L, O2 sat 95%, able to wean off if bed resting. Spouse at bedside; VSS, will continue to monitor. Possible D/C today and walking oximetry required prior.

## 2022-07-11 NOTE — PROGRESS NOTES
Exercise Oximetry    Patient Name:Uri Mirza   MRN: [id number]   Date: 07/11/22             ROOM AIR BASELINE   SpO2% 90   Heart Rate 110   Blood Pressure      EXERCISE ON ROOM AIR SpO2% EXERCISE ON O2 @ 2 LPM SpO2%   1 MINUTE 90 1 MINUTE    2 MINUTES 90 2 MINUTES    3 MINUTES 90 3 MINUTES    4 MINUTES 89 4 MINUTES    5 MINUTES 87 5 MINUTES 89   6 MINUTES  6 MINUTES 90              Distance Walked   Distance Walked   Dyspnea (Nirmal Scale)   Dyspnea (Nirmal Scale)   Fatigue (Nirmal Scale)   Fatigue (Nirmal Scale)   SpO2% Post Exercise   SpO2% Post Exercise   HR Post Exercise   HR Post Exercise   Time to Recovery  Time to Recovery     Comments: Pt out fitted with gait belt, forehead pulse ox and personal shoes.  Pt walked for 5 mins before she became slightly short of air with drop in pulse ox.  2lpm started at the end of walk.  Talking exacerbates the drop in pulse ox.     Pt returned to bed. HR 109bpm and SaO2=92 on Room air   . No adverse effects noted.

## 2022-07-11 NOTE — DISCHARGE SUMMARY
Patient Name: Uri Mirza  : 1990  MRN: [id number]    Date of Admission: 2022  Date of Discharge:  2022  Primary Care Physician: Provider, No Known      Chief Complaint:   Shortness of Breath      Discharge Diagnoses     Active Hospital Problems    Diagnosis  POA   • **Hypoxia [R09.02]  Yes   • Shortness of breath [R06.02]  Unknown   • Elevated d-dimer [R79.89]  Unknown      Resolved Hospital Problems   No resolved problems to display.        Hospital Course     Ms. Mirza is a 32 y.o. female with a history of relatively healthy who presented to [facility] initially complaining of shortness of breath.  Please see the admitting history and physical for further details.  She was found to have hypoxemia and significant changes on CT scan and was admitted to the hospital for further evaluation and treatment.  She is admitted to the hospital and COVID was ruled out during the hospitalization with 2 negative test.  She had pretty significant desaturations on admission and was started on submental oxygen requiring up to 4 to 5 L nasal cannula on admission.  Steroids were initiated and she did show improvement.  She was also started on antibiotics but ruled out for bacterial infection during the hospitalization and they have been discontinued on the day of discharge.  Pulmonary evaluated the patient and plans to follow her up in the outpatient setting.  He remains unclear exactly what driving her inflammatory changes on the CT scan.  The plan is a prolonged steroid taper in the outpatient setting.  Full lab work-up is pending at discharge.  Pulmonary plans to follow these labs up at the office follow-up appointment.  Is recommended she stay off work for the next 7 days and continue to recuperate.  She was provided supplemental oxygen at discharge.  Otherwise at this point she stable to discharge home today with close outpatient follow-up.      Day of Discharge     Subjective:  She is  feeling a lot better still needing some oxygen at times.  Wants to go home today    Physical Exam:  Temp:  [97.4 °F (36.3 °C)-98.2 °F (36.8 °C)] 97.4 °F (36.3 °C)  Heart Rate:  [] 86  Resp:  [18] 18  BP: (105-125)/(70-93) 125/93  Body mass index is 25.4 kg/m².  Physical Exam  Vitals and nursing note reviewed.   Constitutional:       Appearance: Normal appearance.   Cardiovascular:      Rate and Rhythm: Normal rate and regular rhythm.   Pulmonary:      Effort: No respiratory distress.      Breath sounds: Normal breath sounds.   Abdominal:      General: Bowel sounds are normal.      Palpations: Abdomen is soft.   Neurological:      Mental Status: She is alert.         Consultants     Consult Orders (all) (From admission, onward)     Start     Ordered    07/10/22 1803  Inpatient Case Management  Consult  Once        Provider:  (Not yet assigned)    07/10/22 1803    07/09/22 0702  Inpatient Pulmonology Consult  IN         Specialty:  Pulmonary Disease  Provider:  Temo Diego MD    07/09/22 0325    07/09/22 0123  [facility] (on-call MD unless specified) Details  Once        Specialty:  Hospitalist  Provider:  (Not yet assigned)    07/09/22 0123              Procedures     * Surgery not found *      Imaging Results (All)     Procedure Component Value Units Date/Time    CT Angiogram Chest [[id number]] Collected: 07/09/22 0046     Updated: 07/09/22 0057    Narrative:      CT ANGIOGRAM OF THE CHEST     HISTORY: Shortness of air and pleuritic chest pain     COMPARISON: 07/08/2022     TECHNIQUE: Axial CT imaging was obtained through the thorax. IV contrast  was administered. Three-D reformatted images were obtained.     FINDINGS:  No acute pulmonary thromboembolus is seen. Thoracic aorta is normal in  caliber. There is no evidence of dissection. The thyroid gland, trachea,  and esophagus appear unremarkable. The patient does have a small  pericardial effusion. Mediastinal lymph nodes do not  appear  pathologically enlarged. Bilateral pulmonary opacities are seen. There  is peripheral sparing. Infiltrates are more significant on the right.  Images through the upper abdomen do not demonstrate any acute  abnormalities. Thoracic scoliosis, with convexity to the right is again  seen.       Impression:         1. The patient has bilateral pulmonary opacities, with relative  peripheral sparing. The appearance is nonspecific. Both infectious and  inflammatory etiologies would be in the differential, including vaping  associated lung disease, given history.     Radiation dose reduction techniques were utilized, including automated  exposure control and exposure modulation based on body size.     This report was finalized on 7/9/2022 12:54 AM by Dr. Patricia Rogers M.D.       XR Chest 2 View [[id number]] Collected: 07/08/22 1851     Updated: 07/08/22 1947    Narrative:      CHEST TWO VIEWS     HISTORY: Shortness of breath.     COMPARISON: None.     FINDINGS: PA and lateral views of the chest demonstrate the heart to be  within normal limits in size. There is interstitial prominence involving  the perihilar regions bilaterally as well as involving the lungs  centrally and at the right lung base. There is no evidence of  consolidation or effusion. A pneumonic infiltrate or COVID cannot be  excluded. Mild dextroscoliosis of the thoracic spine is noted.     This report was finalized on 7/8/2022 7:44 PM by Dr. John Wood M.D.           Results for orders placed during the hospital encounter of 07/08/22    Duplex Venous Lower Extremity - Bilateral CAR    Interpretation Summary  · Normal bilateral lower extremity venous duplex scan.  · Some inguinal lymphadenopathy is noted.      Pertinent Labs     Results from last 7 days   Lab Units 07/11/22  0533 07/10/22  0449 07/09/22  0641 07/08/22 2017   WBC 10*3/mm3 5.32 6.13 6.52 5.94   HEMOGLOBIN g/dL 10.6* 11.3* 11.4* 10.7*   PLATELETS 10*3/mm3 329 294 330 942      Results from last 7 days   Lab Units 07/11/22  0533 07/10/22  0449 07/09/22  0641 07/08/22  2017   SODIUM mmol/L 137 136 136 137   POTASSIUM mmol/L 4.2 4.5 4.4 3.8   CHLORIDE mmol/L 101 103 99 100   CO2 mmol/L 25.0 23.0 21.0* 24.0   BUN mg/dL 13 11 9 12   CREATININE mg/dL 0.60 0.68 0.71 0.86   GLUCOSE mg/dL 106* 117* 165* 133*   EGFR mL/min/1.73 122.5 118.8 116.0 92.2     Results from last 7 days   Lab Units 07/11/22  0533 07/10/22  0449 07/08/22  2017   ALBUMIN g/dL 3.30* 3.00* 3.70   BILIRUBIN mg/dL  --   --  0.2   ALK PHOS U/L  --   --  50   AST (SGOT) U/L  --   --  30   ALT (SGPT) U/L  --   --  12     Results from last 7 days   Lab Units 07/11/22  0533 07/10/22  0449 07/09/22  0641 07/08/22  2017   CALCIUM mg/dL 8.8 8.9 8.7 8.7   ALBUMIN g/dL 3.30* 3.00*  --  3.70   PHOSPHORUS mg/dL 3.5 4.1  --   --        Results from last 7 days   Lab Units 07/08/22  2332   TROPONIN T ng/mL <0.010   D DIMER QUANT MCGFEU/mL 1.70*     Results from last 7 days   Lab Units 07/11/22 0533   URIC ACID mg/dL 6.0*         Invalid input(s): LDLCALC      Results from last 7 days   Lab Units 07/10/22  0444   COVID19  Not Detected       Test Results Pending at Discharge     Pending Labs     Order Current Status    ANCA Panel In process    Fungitell B-D Glucan In process    Hypersensitivity Pneumonitis Profile In process          Discharge Details        Discharge Medications      New Medications      Instructions Start Date   predniSONE 10 MG tablet  Commonly known as: DELTASONE   Take 4 tablets by mouth Daily With Breakfast for 5 days, THEN 3.5 tablets Daily With Breakfast for 7 days, THEN 3 tablets Daily With Breakfast for 7 days, THEN 2.5 tablets Daily With Breakfast for 7 days, THEN 2 tablets Daily With Breakfast for 5 days, THEN 1 tablet Daily With Breakfast for 5 days, THEN 0.5 tablets Daily With Breakfast for 5 days.   Start Date: July 12, 2022            Allergies   Allergen Reactions   • Banana Itching   • Mushroom Provider  Review Needed       Discharge Disposition:        Discharge Diet:  Diet Order   Procedures   • Diet Regular       Discharge Activity:   Activity Instructions     Activity as Tolerated      Work Restrictions      Type of Restriction: Work    May Return to Work: In 1 Week    With / Without Restrictions: With Restrictions    Explain Work Restrictions: may need to have the ability to be on supplemental oxygen at time          CODE STATUS:    Code Status and Medical Interventions:   Ordered at: 07/09/22 0325     Code Status (Patient has no pulse and is not breathing):    CPR (Attempt to Resuscitate)     Medical Interventions (Patient has pulse or is breathing):    Full Support       No future appointments.  Additional Instructions for the Follow-ups that You Need to Schedule     Discharge Follow-up with PCP   As directed       Currently Documented PCP:    Provider, No Known    PCP Phone Number:    [phone]     Follow Up Details: 2-3 weeks         Discharge Follow-up with Specified Provider: Dr Diego; 1 Month   As directed      To: Dr Diego    Follow Up: 1 Month               Additional Instructions for the Follow-ups that You Need to Schedule     Discharge Follow-up with PCP   As directed       Currently Documented PCP:    Provider, No Known    PCP Phone Number:    [phone]     Follow Up Details: 2-3 weeks         Discharge Follow-up with Specified Provider: Dr Diego; 1 Month   As directed      To: Dr Diego    Follow Up: 1 Month           Time Spent on Discharge:  Greater than 30 minutes      John Hamilton MD  [facility]  07/11/22  08:43 EDT

## 2022-07-11 NOTE — CASE MANAGEMENT/SOCIAL WORK
Case Management Discharge Note      Final Note: Discharge to home with family support and oxygen from [facility]. DRK    Provided Post Acute Provider List?: N/A  N/A Provider List Comment: No needs identified  Provided Post Acute Provider Quality & Resource List?: N/A    Selected Continued Care - Admitted Since 7/8/2022     Destination    No services have been selected for the patient.              Durable Medical Equipment Coordination complete.    Service Provider Selected Services Address Phone Fax Patient Preferred    [facility]  Durable Medical Equipment [address], [place] [address] [phone] --       Internal Comment last updated by Hilda Mast RN 7/11/2022 1006    Patient may need home oxygen.                        Dialysis/Infusion    No services have been selected for the patient.              Home Medical Care    No services have been selected for the patient.              Therapy    No services have been selected for the patient.              Community Resources    No services have been selected for the patient.              Community & DME    No services have been selected for the patient.                  Transportation Services  Private: Car    Final Discharge Disposition Code: 01 - home or self-care

## 2022-07-11 NOTE — DISCHARGE PLACEMENT REQUEST
"Uri Mirza (32 y.o. Female)             Date of Birth   1990    Social Security Number       Address   [address] [facility] [address]    Home Phone   [phone]    MRN   [id number]       [place]   None    Marital Status   Single                            Admission Date   7/8/22    Admission Type   Emergency    Admitting Provider   John Hamilton MD    Attending Provider   John Hamilton MD    Department, Room/Bed   [address], [address]       Discharge Date       Discharge Disposition   Home or Self Care    Discharge Destination                               Attending Provider: John Hamilton MD    Allergies: Banana, Mushroom    Isolation: None   Infection: None   Code Status: CPR   Advance Care Planning Activity    Ht: 162.6 cm (64\")   Wt: 67.1 kg (148 lb)    Admission Cmt: None   Principal Problem: Hypoxia [R09.02]                 Active Insurance as of 7/8/2022     Patient has no active insurance coverage on file for 7/8/2022.          Emergency Contacts      (Rel.) Home Phone Work Phone Mobile Phone    Yumiko Price (Spouse) [phone] -- [phone]    SINANNOY (Brother) [phone] -- --              "

## 2022-07-11 NOTE — TELEPHONE ENCOUNTER
Caller: Uri Mirza    Relationship to patient: Self    Best call back number: COVID SCREENED PASS/FAIL 07/11/22    New or established patient?  [x] New  [] Established    Date of discharge: 07/11/22    Facility discharged from: [facility]    Diagnosis/Symptoms: N/A    Length of stay (If applicable): N/A    Specialty Only: Did you see a [facility] provider?    [] Yes  [x] No  If so, who?

## 2022-07-11 NOTE — PLAN OF CARE
Goal Outcome Evaluation:         Pt meets criteria for discharge, being discharged home with oxygen. Discharge instructions provided. Portable oxygen in room. Pt's IV removed.

## 2022-07-12 ENCOUNTER — TRANSITIONAL CARE MANAGEMENT TELEPHONE ENCOUNTER (OUTPATIENT)
Dept: CALL CENTER | Facility: HOSPITAL | Age: 32
End: 2022-07-12

## 2022-07-12 ENCOUNTER — READMISSION MANAGEMENT (OUTPATIENT)
Dept: CALL CENTER | Facility: HOSPITAL | Age: 32
End: 2022-07-12

## 2022-07-12 NOTE — OUTREACH NOTE
Call Center TCM Note    Flowsheet Row Responses   [facility] patient discharged from? [place]   Does the patient have one of the following disease processes/diagnoses(primary or secondary)? Other   TCM attempt successful? No   Unsuccessful attempts Attempt 2          Rita Varela LPN    7/12/2022, 15:11 EDT

## 2022-07-12 NOTE — OUTREACH NOTE
Prep Survey    Flowsheet Row Responses   [facility] patient discharged from? [place]   Is LACE score < 7 ? Yes   Emergency Room discharge w/ pulse ox? No   Eligibility [facility]   Date of Admission 07/08/22   Date of Discharge 07/11/22   Discharge diagnosis Elevated d-dimer, Hypoxia    Does the patient have one of the following disease processes/diagnoses(primary or secondary)? Other   Does the patient have Home health ordered? No   Is there a DME ordered? Yes   What DME was ordered? home oxygen per [facility]   Prep survey completed? Yes          RYAN HARPER - Registered Nurse

## 2022-07-12 NOTE — OUTREACH NOTE
Call Center TCM Note    Flowsheet Row Responses   [facility] patient discharged from? [place]   Does the patient have one of the following disease processes/diagnoses(primary or secondary)? Other   TCM attempt successful? No   Unsuccessful attempts Attempt 1          Rita Varela LPN    7/12/2022, 14:56 EDT

## 2022-07-13 ENCOUNTER — TRANSITIONAL CARE MANAGEMENT TELEPHONE ENCOUNTER (OUTPATIENT)
Dept: CALL CENTER | Facility: HOSPITAL | Age: 32
End: 2022-07-13

## 2022-07-13 LAB
1,3 BETA GLUCAN SER-MCNC: >500 PG/ML
C-ANCA TITR SER IF: ABNORMAL TITER
MYELOPEROXIDASE AB SER IA-ACNC: 0.3 UNITS (ref 0–0.9)
P-ANCA ATYPICAL TITR SER IF: ABNORMAL TITER
P-ANCA TITR SER IF: ABNORMAL TITER
PROTEINASE3 AB SER IA-ACNC: 0.9 UNITS (ref 0–0.9)

## 2022-07-13 NOTE — OUTREACH NOTE
"Call Center TCM Note    Flowsheet Row Responses   [facility] patient discharged from? [place]   Does the patient have one of the following disease processes/diagnoses(primary or secondary)? Other   TCM attempt successful? Yes   Call end time 1141   Discharge diagnosis Elevated d-dimer, Hypoxia    Meds reviewed with patient/caregiver? Yes   Is the patient having any side effects they believe may be caused by any medication additions or changes? No   Does the patient have all medications ordered at discharge? Yes   Is the patient taking all medications as directed (includes completed medication regime)? Yes   Does the patient have a primary care provider?  Yes   Does the patient have an appointment with their PCP within 7 days of discharge? Greater than 7 days   What is preventing the patient from scheduling follow up appointments within 7 days of discharge? Earlier appointment not available   Has the patient kept scheduled appointments due by today? N/A   Comments Still needs to make appt with pulmonary   What DME was ordered? home oxygen per [facility]   Has all DME been delivered? Yes   Psychosocial issues? No   Did the patient receive a copy of their discharge instructions? Yes   Nursing interventions Reviewed instructions with patient   What is the patient's perception of their health status since discharge? Improving   Is the patient/caregiver able to teach back signs and symptoms related to disease process for when to call PCP? Yes   Is the patient/caregiver able to teach back signs and symptoms related to disease process for when to call 911? Yes   Is the patient/caregiver able to teach back the hierarchy of who to call/visit for symptoms/problems? PCP, Specialist, Home health nurse, Urgent Care, ED, 911 Yes   Additional teach back comments States she is feeling better but still a little winded.  She went to work yesterday but was breathing 'funny\" and they told her to stay home.  She has not needed to " use oxygen.  Advised to get a pulse ox to montior oxygen levels and keep at 90% and above.     TCM call completed? Yes   Wrap up additional comments Denies questions or needs at this time.  Pt to make follow up with pulmonary          Rita Varela LPN    7/13/2022, 11:42 EDT

## 2022-07-18 LAB
A FUMIGATUS IGG SER QL: NEGATIVE
A PULLULANS IGG SER QL: NEGATIVE
LACEYELLA SACCHARI AB SER QL ID: NEGATIVE
PIGEON SERUM IGG QL: NEGATIVE
S RECTIVIRGULA IGG SER QL ID: NEGATIVE
T VULGARIS IGG SER QL: NEGATIVE

## 2022-08-08 ENCOUNTER — APPOINTMENT (OUTPATIENT)
Dept: CT IMAGING | Facility: HOSPITAL | Age: 32
End: 2022-08-08

## 2022-08-08 ENCOUNTER — HOSPITAL ENCOUNTER (INPATIENT)
Facility: HOSPITAL | Age: 32
LOS: 5 days | Discharge: HOME | End: 2022-08-13
Attending: EMERGENCY MEDICINE | Admitting: INTERNAL MEDICINE

## 2022-08-08 ENCOUNTER — APPOINTMENT (OUTPATIENT)
Dept: GENERAL RADIOLOGY | Facility: HOSPITAL | Age: 32
End: 2022-08-08

## 2022-08-08 DIAGNOSIS — J96.01 ACUTE RESPIRATORY FAILURE WITH HYPOXIA: ICD-10-CM

## 2022-08-08 DIAGNOSIS — J18.9 PNEUMONIA OF BOTH LUNGS DUE TO INFECTIOUS ORGANISM, UNSPECIFIED PART OF LUNG: Primary | ICD-10-CM

## 2022-08-08 LAB
ALBUMIN SERPL-MCNC: 3.5 G/DL (ref 3.5–5.2)
ALBUMIN/GLOB SERPL: 0.7 G/DL
ALP SERPL-CCNC: 54 U/L (ref 39–117)
ALT SERPL W P-5'-P-CCNC: 17 U/L (ref 1–33)
ANION GAP SERPL CALCULATED.3IONS-SCNC: 14.7 MMOL/L (ref 5–15)
AST SERPL-CCNC: 33 U/L (ref 1–32)
B PARAPERT DNA SPEC QL NAA+PROBE: NOT DETECTED
B PERT DNA SPEC QL NAA+PROBE: NOT DETECTED
BASOPHILS # BLD AUTO: 0.02 10*3/MM3 (ref 0–0.2)
BASOPHILS NFR BLD AUTO: 0.5 % (ref 0–1.5)
BILIRUB SERPL-MCNC: 0.2 MG/DL (ref 0–1.2)
BUN SERPL-MCNC: 12 MG/DL (ref 6–20)
BUN/CREAT SERPL: 16.7 (ref 7–25)
C PNEUM DNA NPH QL NAA+NON-PROBE: NOT DETECTED
CALCIUM SPEC-SCNC: 8.9 MG/DL (ref 8.6–10.5)
CHLORIDE SERPL-SCNC: 99 MMOL/L (ref 98–107)
CO2 SERPL-SCNC: 23.3 MMOL/L (ref 22–29)
CREAT SERPL-MCNC: 0.72 MG/DL (ref 0.57–1)
D-LACTATE SERPL-SCNC: 1.5 MMOL/L (ref 0.5–2)
DEPRECATED RDW RBC AUTO: 37.3 FL (ref 37–54)
EGFRCR SERPLBLD CKD-EPI 2021: 114.1 ML/MIN/1.73
EOSINOPHIL # BLD AUTO: 0.08 10*3/MM3 (ref 0–0.4)
EOSINOPHIL NFR BLD AUTO: 1.9 % (ref 0.3–6.2)
ERYTHROCYTE [DISTWIDTH] IN BLOOD BY AUTOMATED COUNT: 13.3 % (ref 12.3–15.4)
FLUAV SUBTYP SPEC NAA+PROBE: NOT DETECTED
FLUBV RNA ISLT QL NAA+PROBE: NOT DETECTED
GLOBULIN UR ELPH-MCNC: 5 GM/DL
GLUCOSE SERPL-MCNC: 100 MG/DL (ref 65–99)
HADV DNA SPEC NAA+PROBE: NOT DETECTED
HCG SERPL QL: NEGATIVE
HCOV 229E RNA SPEC QL NAA+PROBE: NOT DETECTED
HCOV HKU1 RNA SPEC QL NAA+PROBE: NOT DETECTED
HCOV NL63 RNA SPEC QL NAA+PROBE: NOT DETECTED
HCOV OC43 RNA SPEC QL NAA+PROBE: NOT DETECTED
HCT VFR BLD AUTO: 37 % (ref 34–46.6)
HGB BLD-MCNC: 12.1 G/DL (ref 12–15.9)
HMPV RNA NPH QL NAA+NON-PROBE: NOT DETECTED
HPIV1 RNA ISLT QL NAA+PROBE: NOT DETECTED
HPIV2 RNA SPEC QL NAA+PROBE: NOT DETECTED
HPIV3 RNA NPH QL NAA+PROBE: NOT DETECTED
HPIV4 P GENE NPH QL NAA+PROBE: NOT DETECTED
IMM GRANULOCYTES # BLD AUTO: 0.03 10*3/MM3 (ref 0–0.05)
IMM GRANULOCYTES NFR BLD AUTO: 0.7 % (ref 0–0.5)
LYMPHOCYTES # BLD AUTO: 0.71 10*3/MM3 (ref 0.7–3.1)
LYMPHOCYTES NFR BLD AUTO: 16.7 % (ref 19.6–45.3)
M PNEUMO IGG SER IA-ACNC: NOT DETECTED
MCH RBC QN AUTO: 25.7 PG (ref 26.6–33)
MCHC RBC AUTO-ENTMCNC: 32.7 G/DL (ref 31.5–35.7)
MCV RBC AUTO: 78.7 FL (ref 79–97)
MONOCYTES # BLD AUTO: 0.35 10*3/MM3 (ref 0.1–0.9)
MONOCYTES NFR BLD AUTO: 8.2 % (ref 5–12)
NEUTROPHILS NFR BLD AUTO: 3.07 10*3/MM3 (ref 1.7–7)
NEUTROPHILS NFR BLD AUTO: 72 % (ref 42.7–76)
NRBC BLD AUTO-RTO: 0.2 /100 WBC (ref 0–0.2)
NT-PROBNP SERPL-MCNC: 14.7 PG/ML (ref 0–450)
PLATELET # BLD AUTO: 278 10*3/MM3 (ref 140–450)
PMV BLD AUTO: 11.1 FL (ref 6–12)
POTASSIUM SERPL-SCNC: 3.5 MMOL/L (ref 3.5–5.2)
PROCALCITONIN SERPL-MCNC: 0.08 NG/ML (ref 0–0.25)
PROT SERPL-MCNC: 8.5 G/DL (ref 6–8.5)
QT INTERVAL: 355 MS
RBC # BLD AUTO: 4.7 10*6/MM3 (ref 3.77–5.28)
RHINOVIRUS RNA SPEC NAA+PROBE: NOT DETECTED
RSV RNA NPH QL NAA+NON-PROBE: NOT DETECTED
SARS-COV-2 RNA NPH QL NAA+NON-PROBE: NOT DETECTED
SODIUM SERPL-SCNC: 137 MMOL/L (ref 136–145)
TROPONIN T SERPL-MCNC: <0.01 NG/ML (ref 0–0.03)
TROPONIN T SERPL-MCNC: <0.01 NG/ML (ref 0–0.03)
WBC NRBC COR # BLD: 4.26 10*3/MM3 (ref 3.4–10.8)

## 2022-08-08 PROCEDURE — 25010000002 VANCOMYCIN 10 G RECONSTITUTED SOLUTION: Performed by: EMERGENCY MEDICINE

## 2022-08-08 PROCEDURE — 93010 ELECTROCARDIOGRAM REPORT: CPT | Performed by: INTERNAL MEDICINE

## 2022-08-08 PROCEDURE — 25010000002 CEFEPIME PER 500 MG: Performed by: EMERGENCY MEDICINE

## 2022-08-08 PROCEDURE — 99284 EMERGENCY DEPT VISIT MOD MDM: CPT

## 2022-08-08 PROCEDURE — 83880 ASSAY OF NATRIURETIC PEPTIDE: CPT | Performed by: EMERGENCY MEDICINE

## 2022-08-08 PROCEDURE — 80053 COMPREHEN METABOLIC PANEL: CPT | Performed by: EMERGENCY MEDICINE

## 2022-08-08 PROCEDURE — 93005 ELECTROCARDIOGRAM TRACING: CPT | Performed by: EMERGENCY MEDICINE

## 2022-08-08 PROCEDURE — 71045 X-RAY EXAM CHEST 1 VIEW: CPT

## 2022-08-08 PROCEDURE — 87899 AGENT NOS ASSAY W/OPTIC: CPT | Performed by: INTERNAL MEDICINE

## 2022-08-08 PROCEDURE — 25010000002 METHYLPREDNISOLONE PER 40 MG: Performed by: INTERNAL MEDICINE

## 2022-08-08 PROCEDURE — 85025 COMPLETE CBC W/AUTO DIFF WBC: CPT | Performed by: EMERGENCY MEDICINE

## 2022-08-08 PROCEDURE — 84145 PROCALCITONIN (PCT): CPT | Performed by: EMERGENCY MEDICINE

## 2022-08-08 PROCEDURE — 0 IOPAMIDOL PER 1 ML: Performed by: EMERGENCY MEDICINE

## 2022-08-08 PROCEDURE — 84484 ASSAY OF TROPONIN QUANT: CPT | Performed by: INTERNAL MEDICINE

## 2022-08-08 PROCEDURE — 71275 CT ANGIOGRAPHY CHEST: CPT

## 2022-08-08 PROCEDURE — 83605 ASSAY OF LACTIC ACID: CPT | Performed by: EMERGENCY MEDICINE

## 2022-08-08 PROCEDURE — 86592 SYPHILIS TEST NON-TREP QUAL: CPT | Performed by: INTERNAL MEDICINE

## 2022-08-08 PROCEDURE — 0202U NFCT DS 22 TRGT SARS-COV-2: CPT | Performed by: EMERGENCY MEDICINE

## 2022-08-08 PROCEDURE — 86803 HEPATITIS C AB TEST: CPT | Performed by: INTERNAL MEDICINE

## 2022-08-08 PROCEDURE — 87040 BLOOD CULTURE FOR BACTERIA: CPT | Performed by: EMERGENCY MEDICINE

## 2022-08-08 PROCEDURE — 84703 CHORIONIC GONADOTROPIN ASSAY: CPT | Performed by: EMERGENCY MEDICINE

## 2022-08-08 PROCEDURE — 84484 ASSAY OF TROPONIN QUANT: CPT | Performed by: EMERGENCY MEDICINE

## 2022-08-08 RX ORDER — NITROGLYCERIN 0.4 MG/1
0.4 TABLET SUBLINGUAL
Status: DISCONTINUED | OUTPATIENT
Start: 2022-08-08 | End: 2022-08-13 | Stop reason: HOSPADM

## 2022-08-08 RX ORDER — UREA 10 %
3 LOTION (ML) TOPICAL NIGHTLY PRN
Status: DISCONTINUED | OUTPATIENT
Start: 2022-08-08 | End: 2022-08-13 | Stop reason: HOSPADM

## 2022-08-08 RX ORDER — ACETAMINOPHEN 325 MG/1
650 TABLET ORAL EVERY 4 HOURS PRN
Status: DISCONTINUED | OUTPATIENT
Start: 2022-08-08 | End: 2022-08-13 | Stop reason: HOSPADM

## 2022-08-08 RX ORDER — ONDANSETRON 4 MG/1
4 TABLET, FILM COATED ORAL EVERY 6 HOURS PRN
Status: DISCONTINUED | OUTPATIENT
Start: 2022-08-08 | End: 2022-08-13 | Stop reason: HOSPADM

## 2022-08-08 RX ORDER — SODIUM CHLORIDE 0.9 % (FLUSH) 0.9 %
10 SYRINGE (ML) INJECTION AS NEEDED
Status: DISCONTINUED | OUTPATIENT
Start: 2022-08-08 | End: 2022-08-13 | Stop reason: HOSPADM

## 2022-08-08 RX ORDER — BENZONATATE 100 MG/1
100 CAPSULE ORAL 3 TIMES DAILY PRN
Status: DISCONTINUED | OUTPATIENT
Start: 2022-08-08 | End: 2022-08-13 | Stop reason: HOSPADM

## 2022-08-08 RX ORDER — METHYLPREDNISOLONE SODIUM SUCCINATE 40 MG/ML
40 INJECTION, POWDER, LYOPHILIZED, FOR SOLUTION INTRAMUSCULAR; INTRAVENOUS EVERY 8 HOURS
Status: COMPLETED | OUTPATIENT
Start: 2022-08-08 | End: 2022-08-12

## 2022-08-08 RX ORDER — ONDANSETRON 2 MG/ML
4 INJECTION INTRAMUSCULAR; INTRAVENOUS EVERY 6 HOURS PRN
Status: DISCONTINUED | OUTPATIENT
Start: 2022-08-08 | End: 2022-08-13 | Stop reason: HOSPADM

## 2022-08-08 RX ADMIN — IOPAMIDOL 95 ML: 755 INJECTION, SOLUTION INTRAVENOUS at 16:15

## 2022-08-08 RX ADMIN — METHYLPREDNISOLONE SODIUM SUCCINATE 40 MG: 40 INJECTION, POWDER, FOR SOLUTION INTRAMUSCULAR; INTRAVENOUS at 21:38

## 2022-08-08 RX ADMIN — CEFEPIME 2 G: 2 INJECTION, POWDER, FOR SOLUTION INTRAVENOUS at 17:51

## 2022-08-08 RX ADMIN — VANCOMYCIN HYDROCHLORIDE 1250 MG: 10 INJECTION, POWDER, LYOPHILIZED, FOR SOLUTION INTRAVENOUS at 18:29

## 2022-08-08 NOTE — ED NOTES
Patient from home via PV; states she was at work and started having shortness of breath. Patient recently admitted to the hospital for hypoxia and lung infiltrate with oxygen. Patient is also having unexpected weight loss.

## 2022-08-08 NOTE — ED PROVIDER NOTES
EMERGENCY DEPARTMENT ENCOUNTER    CHIEF COMPLAINT  Chief Complaint: Shortness of breath  History given by: Patient  History limited by: None   Room Number: N631/1  PMD: Provider, No Known      HPI:  Pt is a 32 y.o. female who presents complaining of shortness of breath that has been present for the past 1 to 2 months but steadily worsening over the past 1 to 2 days.  She was in the hospital recently secondary to inflammatory changes in her lungs of uncertain etiology.  She has completed a course of steroids and antifungal medication is having no improvement in symptoms.  The symptoms became significantly worse today prompting her emergency room visit.  Her oxygen saturations were in the low to mid 80s on room air upon her arrival here.  She complains of an associated productive cough, fever/chills, diarrhea, as well as weight loss over the past 1 month.  The patient reports at discharge on her previous admission that she was discharged on supplemental oxygen but has since discontinued the oxygen use.    Duration: Ongoing for the past 1 to 2 months, worsening over the past day to 2 days  Onset: Gradual  Location: Pulmonary  Radiation: None  Quality: Shortness of breath  Intensity/Severity: Moderate to severe  Progression: Worsening  Associated Symptoms: Cough, fever/chills  Aggravating Factors: Exertion  Alleviating Factors: None  Previous Episodes: Yes, recent admission for same  Treatment before arrival: None    PAST MEDICAL HISTORY  Active Ambulatory Problems     Diagnosis Date Noted   • Hypoxia 07/09/2022   • Shortness of breath 07/09/2022   • Elevated d-dimer 07/09/2022     Resolved Ambulatory Problems     Diagnosis Date Noted   • No Resolved Ambulatory Problems     No Additional Past Medical History       PAST SURGICAL HISTORY  History reviewed. No pertinent surgical history.    FAMILY HISTORY  History reviewed. No pertinent family history.    SOCIAL HISTORY  Social History     Socioeconomic History   •  Marital status: Single   Tobacco Use   • Smoking status: Never Smoker   • Smokeless tobacco: Never Used   Vaping Use   • Vaping Use: Some days   • Devices: started vapping last month, has stopped since SOA started   Substance and Sexual Activity   • Alcohol use: Yes     Comment: socc   • Drug use: Never   • Sexual activity: Defer       ALLERGIES  Banana and Mushroom    REVIEW OF SYSTEMS  Review of Systems   Constitutional: Positive for chills, fever and unexpected weight change.   HENT: Negative for sore throat.    Eyes: Negative.    Respiratory: Positive for cough and shortness of breath.    Cardiovascular: Negative for chest pain.   Gastrointestinal: Positive for diarrhea. Negative for abdominal pain and vomiting.   Genitourinary: Negative for dysuria.   Musculoskeletal: Negative for neck pain.   Skin: Negative for rash.   Allergic/Immunologic: Negative.    Neurological: Negative for weakness, numbness and headaches.   Hematological: Negative.    Psychiatric/Behavioral: Negative.    All other systems reviewed and are negative.      PHYSICAL EXAM  ED Triage Vitals [08/08/22 1300]   Temp Heart Rate Resp BP SpO2   99.5 °F (37.5 °C) (!) 133 16 132/80 (!) 85 %      Temp src Heart Rate Source Patient Position BP Location FiO2 (%)   -- -- -- -- --       Physical Exam  Vitals and nursing note reviewed.   Constitutional:       General: She is not in acute distress.  HENT:      Head: Normocephalic and atraumatic.   Eyes:      Pupils: Pupils are equal, round, and reactive to light.   Cardiovascular:      Rate and Rhythm: Normal rate and regular rhythm.      Heart sounds: Normal heart sounds.   Pulmonary:      Effort: Pulmonary effort is normal. No respiratory distress.      Breath sounds: Examination of the right-lower field reveals rhonchi. Examination of the left-lower field reveals rhonchi. Rhonchi present.   Abdominal:      Palpations: Abdomen is soft.      Tenderness: There is no abdominal tenderness. There is no  guarding or rebound.   Musculoskeletal:         General: Normal range of motion.      Cervical back: Normal range of motion and neck supple.   Skin:     General: Skin is warm and dry.      Findings: No rash.   Neurological:      Mental Status: She is alert and oriented to person, place, and time.      Sensory: Sensation is intact.   Psychiatric:         Mood and Affect: Mood and affect normal.         LAB RESULTS  Lab Results (last 24 hours)     Procedure Component Value Units Date/Time    CBC & Differential [[id number]]  (Abnormal) Collected: 08/08/22 1514    Specimen: Blood Updated: 08/08/22 1528    Narrative:      The following orders were created for panel order CBC & Differential.  Procedure                               Abnormality         Status                     ---------                               -----------         ------                     CBC Auto Differential[[id number]]        Abnormal            Final result                 Please view results for these tests on the individual orders.    Comprehensive Metabolic Panel [[id number]]  (Abnormal) Collected: 08/08/22 1514    Specimen: Blood Updated: 08/08/22 1559     Glucose 100 mg/dL      BUN 12 mg/dL      Creatinine 0.72 mg/dL      Sodium 137 mmol/L      Potassium 3.5 mmol/L      Chloride 99 mmol/L      CO2 23.3 mmol/L      Calcium 8.9 mg/dL      Total Protein 8.5 g/dL      Albumin 3.50 g/dL      ALT (SGPT) 17 U/L      AST (SGOT) 33 U/L      Alkaline Phosphatase 54 U/L      Total Bilirubin 0.2 mg/dL      Globulin 5.0 gm/dL      A/G Ratio 0.7 g/dL      BUN/Creatinine Ratio 16.7     Anion Gap 14.7 mmol/L      eGFR 114.1 mL/min/1.73      Comment: National Kidney Foundation and American Society of Nephrology (ASN) Task Force recommended calculation based on the Chronic Kidney Disease Epidemiology Collaboration (CKD-EPI) equation refit without adjustment for race.       Narrative:      GFR Normal >60  Chronic Kidney Disease <60  Kidney Failure <15       Troponin [[id number]]  (Normal) Collected: 08/08/22 1514    Specimen: Blood Updated: 08/08/22 1559     Troponin T <0.010 ng/mL     Narrative:      Troponin T Reference Range:  <= 0.03 ng/mL-   Negative for AMI  >0.03 ng/mL-     Abnormal for myocardial necrosis.  Clinicians would have to utilize clinical acumen, EKG, Troponin and serial changes to determine if it is an Acute Myocardial Infarction or myocardial injury due to an underlying chronic condition.       Results may be falsely decreased if patient taking Biotin.      BNP [[id number]]  (Normal) Collected: 08/08/22 1514    Specimen: Blood Updated: 08/08/22 1547     proBNP 14.7 pg/mL     Narrative:      Among patients with dyspnea, NT-proBNP is highly sensitive for the detection of acute congestive heart failure. In addition NT-proBNP of <300 pg/ml effectively rules out acute congestive heart failure with 99% negative predictive value.    Results may be falsely decreased if patient taking Biotin.      Blood Culture - Blood, Arm, Left [[id number]] Collected: 08/08/22 1514    Specimen: Blood from Arm, Left Updated: 08/08/22 1519    Lactic Acid, Plasma [[id number]]  (Normal) Collected: 08/08/22 1514    Specimen: Blood Updated: 08/08/22 1542     Lactate 1.5 mmol/L     Procalcitonin [[id number]]  (Normal) Collected: 08/08/22 1514    Specimen: Blood Updated: 08/08/22 1554     Procalcitonin 0.08 ng/mL     Narrative:      As a Marker for Sepsis (Non-Neonates):    1. <0.5 ng/mL represents a low risk of severe sepsis and/or septic shock.  2. >2 ng/mL represents a high risk of severe sepsis and/or septic shock.    As a Marker for Lower Respiratory Tract Infections that require antibiotic therapy:    PCT on Admission    Antibiotic Therapy       6-12 Hrs later    >0.5                Strongly Recommended  >0.25 - <0.5        Recommended   0.1 - 0.25          Discouraged              Remeasure/reassess PCT  <0.1                Strongly Discouraged     Remeasure/reassess PCT    As  "28 day mortality risk marker: \"Change in Procalcitonin Result\" (>80% or <=80%) if Day 0 (or Day 1) and Day 4 values are available. Refer to http://www.[facility]-pct-calculator.com    Change in PCT <=80%  A decrease of PCT levels below or equal to 80% defines a positive change in PCT test result representing a higher risk for 28-day all-cause mortality of patients diagnosed with severe sepsis for septic shock.    Change in PCT >80%  A decrease of PCT levels of more than 80% defines a negative change in PCT result representing a lower risk for 28-day all-cause mortality of patients diagnosed with severe sepsis or septic shock.       hCG, Serum, Qualitative [[id number]]  (Normal) Collected: 08/08/22 1514    Specimen: Blood Updated: 08/08/22 1554     HCG Qualitative Negative    CBC Auto Differential [[id number]]  (Abnormal) Collected: 08/08/22 1514    Specimen: Blood Updated: 08/08/22 1528     WBC 4.26 10*3/mm3      RBC 4.70 10*6/mm3      Hemoglobin 12.1 g/dL      Hematocrit 37.0 %      MCV 78.7 fL      MCH 25.7 pg      MCHC 32.7 g/dL      RDW 13.3 %      RDW-SD 37.3 fl      MPV 11.1 fL      Platelets 278 10*3/mm3      Neutrophil % 72.0 %      Lymphocyte % 16.7 %      Monocyte % 8.2 %      Eosinophil % 1.9 %      Basophil % 0.5 %      Immature Grans % 0.7 %      Neutrophils, Absolute 3.07 10*3/mm3      Lymphocytes, Absolute 0.71 10*3/mm3      Monocytes, Absolute 0.35 10*3/mm3      Eosinophils, Absolute 0.08 10*3/mm3      Basophils, Absolute 0.02 10*3/mm3      Immature Grans, Absolute 0.03 10*3/mm3      nRBC 0.2 /100 WBC     Respiratory Panel PCR w/COVID-19(SARS-CoV-2) [facility] In-House, NP Swab in [facility], 3-4 HR TAT - Swab, Nasopharynx [[id number]]  (Normal) Collected: 08/08/22 1711    Specimen: Swab from Nasopharynx Updated: 08/08/22 1842     ADENOVIRUS, PCR Not Detected     Coronavirus 229E Not Detected     Coronavirus HKU1 Not Detected     Coronavirus NL63 Not Detected     Coronavirus OC43 Not Detected "     COVID19 Not Detected     Human Metapneumovirus Not Detected     Human Rhinovirus/Enterovirus Not Detected     Influenza A PCR Not Detected     Influenza B PCR Not Detected     Parainfluenza Virus 1 Not Detected     Parainfluenza Virus 2 Not Detected     Parainfluenza Virus 3 Not Detected     Parainfluenza Virus 4 Not Detected     RSV, PCR Not Detected     Bordetella pertussis pcr Not Detected     Bordetella parapertussis PCR Not Detected     Chlamydophila pneumoniae PCR Not Detected     Mycoplasma pneumo by PCR Not Detected    Narrative:      In the setting of a positive respiratory panel with a viral infection PLUS a negative procalcitonin without other underlying concern for bacterial infection, consider observing off antibiotics or discontinuation of antibiotics and continue supportive care. If the respiratory panel is positive for atypical bacterial infection (Bordetella pertussis, Chlamydophila pneumoniae, or Mycoplasma pneumoniae), consider antibiotic de-escalation to target atypical bacterial infection.    Troponin [[id number]]  (Normal) Collected: 08/08/22 2206    Specimen: Blood Updated: 08/08/22 2243     Troponin T <0.010 ng/mL     Narrative:      Troponin T Reference Range:  <= 0.03 ng/mL-   Negative for AMI  >0.03 ng/mL-     Abnormal for myocardial necrosis.  Clinicians would have to utilize clinical acumen, EKG, Troponin and serial changes to determine if it is an Acute Myocardial Infarction or myocardial injury due to an underlying chronic condition.       Results may be falsely decreased if patient taking Biotin.            I ordered the above labs and reviewed the results    RADIOLOGY  CT Angiogram Chest   Final Result   1. There is no evidence for a pulmonary embolism.    2. There has been mild interval improvement in multilobar bilateral   pneumonia, particularly within the bilateral upper lobes.       These findings were communicated to Sanket Youngblood MD, in the emergency   room on 08/08/2022 at  4:50 PM.       Radiation dose reduction techniques were utilized, including automated   exposure control and exposure modulation based on body size.       This report was finalized on 8/8/2022 9:13 PM by Dr. Billy Manuel M.D.          XR Chest 1 View   Final Result   Symmetric opacity in the mid and lower lung zones   bilaterally favored represent bilateral pulmonary edema.       This report was finalized on 8/8/2022 3:25 PM by Dr. Marvin Gautam M.D.               I ordered the above noted radiological studies. Interpreted by radiologist. Discussed with radiologist (Dr. Manuel). Reviewed by me in PACS.       PROCEDURES  Procedures  EKG          EKG time: 1533  Rhythm/Rate: sinus tachycardia, 102  P waves and VA: nml  QRS, axis: nml, nml   ST and T waves: Nonspecific ST/T changes    Interpreted Contemporaneously by me, independently viewed  changed compared to prior 7/8/22      PROGRESS AND CONSULTS     The patient was wearing a facemask upon entrance into the room and remained in such throughout their visit.  I was wearing PPE including a facemask, eye protection, as well as gloves at any point entering the room and throughout the visit    1735  On reevaluation, the patient is resting comfortably and remains on 4 L of oxygen by nasal cannula.  Oxygen saturations are currently 93%.  I did inform the patient that the CT scan did show mild improvement of symptoms however a significant bilateral pneumonia remains.  We will begin the patient on broad-spectrum IV antibiotics and admit her to the hospital for further work-up and treatment.  The patient agrees with today's plan and all questions have been answered.    1755  Case discussed with Dr. Gagnon [facility], who will admit the patient to the hospital for further management and treatment.    MEDICAL DECISION MAKING  Results were reviewed/discussed with the patient and they were also made aware of online access. Pt also made aware that some labs, such as cultures,  will not be resulted during ER visit and follow up with PMD is necessary.     MDM  Number of Diagnoses or Management Options     Amount and/or Complexity of Data Reviewed  Clinical lab tests: ordered and reviewed  Tests in the radiology section of CPT®: ordered and reviewed  Tests in the medicine section of CPT®: ordered and reviewed  Review and summarize past medical records: yes (Upon medical records review, the patient was last seen and evaluated and admitted to the hospital on 7/8/2022 secondary to inflammatory lung changes with hypoxemia)  Discuss the patient with other providers: yes (Dr. Gagnon, [facility], who will admit the patient to the hospital)  Independent visualization of images, tracings, or specimens: yes (CT chest shows no pulmonary embolism however multiple areas of infiltrate bilaterally)           DIAGNOSIS  Final diagnoses:   Pneumonia of both lungs due to infectious organism, unspecified part of lung   Acute respiratory failure with hypoxia (HCC)       DISPOSITION  ADMISSION    Discussed treatment plan and reason for admission with pt/family and admitting physician.  Pt/family voiced understanding of the plan for admission for further testing/treatment as needed.         Latest Documented Vital Signs:  As of 22:50 EDT  BP- 117/83 HR- 101 Temp- 98.6 °F (37 °C) (Oral) O2 sat- 93%         Sanket Youngblood MD  08/08/22 1517

## 2022-08-08 NOTE — ED NOTES
Appropriate PPE was worn while caring for this patient (N95 and eye protection). Pt also wore a mask.

## 2022-08-09 PROBLEM — J96.01 ACUTE RESPIRATORY FAILURE WITH HYPOXIA: Status: ACTIVE | Noted: 2022-08-09

## 2022-08-09 PROBLEM — D64.9 ANEMIA: Status: ACTIVE | Noted: 2022-08-09

## 2022-08-09 LAB
ANION GAP SERPL CALCULATED.3IONS-SCNC: 9 MMOL/L (ref 5–15)
BUN SERPL-MCNC: 12 MG/DL (ref 6–20)
BUN/CREAT SERPL: 19.4 (ref 7–25)
CALCIUM SPEC-SCNC: 8.8 MG/DL (ref 8.6–10.5)
CHLORIDE SERPL-SCNC: 104 MMOL/L (ref 98–107)
CO2 SERPL-SCNC: 22 MMOL/L (ref 22–29)
CREAT SERPL-MCNC: 0.62 MG/DL (ref 0.57–1)
DEPRECATED RDW RBC AUTO: 38.1 FL (ref 37–54)
EGFRCR SERPLBLD CKD-EPI 2021: 121.5 ML/MIN/1.73
ERYTHROCYTE [DISTWIDTH] IN BLOOD BY AUTOMATED COUNT: 13.5 % (ref 12.3–15.4)
GLUCOSE SERPL-MCNC: 166 MG/DL (ref 65–99)
HCT VFR BLD AUTO: 32.8 % (ref 34–46.6)
HGB BLD-MCNC: 10.7 G/DL (ref 12–15.9)
MCH RBC QN AUTO: 25.7 PG (ref 26.6–33)
MCHC RBC AUTO-ENTMCNC: 32.6 G/DL (ref 31.5–35.7)
MCV RBC AUTO: 78.8 FL (ref 79–97)
PLATELET # BLD AUTO: 265 10*3/MM3 (ref 140–450)
PMV BLD AUTO: 11.4 FL (ref 6–12)
POTASSIUM SERPL-SCNC: 4.5 MMOL/L (ref 3.5–5.2)
RBC # BLD AUTO: 4.16 10*6/MM3 (ref 3.77–5.28)
SODIUM SERPL-SCNC: 135 MMOL/L (ref 136–145)
WBC NRBC COR # BLD: 3.88 10*3/MM3 (ref 3.4–10.8)

## 2022-08-09 PROCEDURE — 87040 BLOOD CULTURE FOR BACTERIA: CPT | Performed by: EMERGENCY MEDICINE

## 2022-08-09 PROCEDURE — 36415 COLL VENOUS BLD VENIPUNCTURE: CPT | Performed by: INTERNAL MEDICINE

## 2022-08-09 PROCEDURE — 86431 RHEUMATOID FACTOR QUANT: CPT | Performed by: INTERNAL MEDICINE

## 2022-08-09 PROCEDURE — 80048 BASIC METABOLIC PNL TOTAL CA: CPT | Performed by: INTERNAL MEDICINE

## 2022-08-09 PROCEDURE — 25010000002 CEFEPIME PER 500 MG: Performed by: NURSE PRACTITIONER

## 2022-08-09 PROCEDURE — 25010000002 METHYLPREDNISOLONE PER 40 MG: Performed by: INTERNAL MEDICINE

## 2022-08-09 PROCEDURE — 25010000002 VANCOMYCIN PER 500 MG: Performed by: INTERNAL MEDICINE

## 2022-08-09 PROCEDURE — 87641 MR-STAPH DNA AMP PROBE: CPT | Performed by: HOSPITALIST

## 2022-08-09 PROCEDURE — 85027 COMPLETE CBC AUTOMATED: CPT | Performed by: INTERNAL MEDICINE

## 2022-08-09 RX ORDER — ENOXAPARIN SODIUM 100 MG/ML
40 INJECTION SUBCUTANEOUS NIGHTLY
Status: DISCONTINUED | OUTPATIENT
Start: 2022-08-09 | End: 2022-08-13 | Stop reason: HOSPADM

## 2022-08-09 RX ORDER — VANCOMYCIN HYDROCHLORIDE 1 G/200ML
1000 INJECTION, SOLUTION INTRAVENOUS EVERY 12 HOURS
Status: DISCONTINUED | OUTPATIENT
Start: 2022-08-09 | End: 2022-08-10

## 2022-08-09 RX ADMIN — CEFEPIME 2 G: 2 INJECTION, POWDER, FOR SOLUTION INTRAVENOUS at 21:25

## 2022-08-09 RX ADMIN — VANCOMYCIN HYDROCHLORIDE 1000 MG: 1 INJECTION, SOLUTION INTRAVENOUS at 17:33

## 2022-08-09 RX ADMIN — CEFEPIME 2 G: 2 INJECTION, POWDER, FOR SOLUTION INTRAVENOUS at 04:00

## 2022-08-09 RX ADMIN — METHYLPREDNISOLONE SODIUM SUCCINATE 40 MG: 40 INJECTION, POWDER, FOR SOLUTION INTRAMUSCULAR; INTRAVENOUS at 14:32

## 2022-08-09 RX ADMIN — CEFEPIME 2 G: 2 INJECTION, POWDER, FOR SOLUTION INTRAVENOUS at 13:04

## 2022-08-09 RX ADMIN — VANCOMYCIN HYDROCHLORIDE 1000 MG: 1 INJECTION, SOLUTION INTRAVENOUS at 08:04

## 2022-08-09 RX ADMIN — METHYLPREDNISOLONE SODIUM SUCCINATE 40 MG: 40 INJECTION, POWDER, FOR SOLUTION INTRAMUSCULAR; INTRAVENOUS at 21:25

## 2022-08-09 RX ADMIN — METHYLPREDNISOLONE SODIUM SUCCINATE 40 MG: 40 INJECTION, POWDER, FOR SOLUTION INTRAMUSCULAR; INTRAVENOUS at 04:13

## 2022-08-09 NOTE — NURSING NOTE
Peripheral IV is positional. Antibiotic, Cefepime, is still infusing at time of writing. Wrapped IV site with kerlex to stabilize catheter.

## 2022-08-09 NOTE — NURSING NOTE
Oxygen ran high 80's. Placed and tritated oxygen to 4 lpm to bring saturation up to mid- to low- 90's. Tritated o2 down one liter with saturation holding in mid- 90's.

## 2022-08-09 NOTE — CONSULTS
Consult request for Covid rule out isolation removal. COVID rule out test was ordered for suspected COVID infection. The test result is negative, but will need a provider to review the patient and place an order to remove Isolation Precautions if COVID 19 is not suspected. Notified RN today.

## 2022-08-09 NOTE — PLAN OF CARE
Problem: Adult Inpatient Plan of Care  Goal: Absence of Hospital-Acquired Illness or Injury  Outcome: Ongoing, Progressing  Goal: Optimal Comfort and Wellbeing  Outcome: Ongoing, Progressing     Problem: Fall Injury Risk  Goal: Absence of Fall and Fall-Related Injury  Outcome: Ongoing, Progressing   Goal Outcome Evaluation:

## 2022-08-09 NOTE — PLAN OF CARE
Goal Outcome Evaluation:  Plan of Care Reviewed With: patient      Patient soa with exertion and while talking, tolerating NC @ 3L. No c/o pain or n/v this shift.Patient up ad levon to bathroom. Remains stable at this time.    Pulmonary consult recalled,  awaiting response

## 2022-08-09 NOTE — H&P
HISTORY AND PHYSICAL   [facility]        Date of Admission: 2022  Patient Identification:  Name: Uri Mirza  Age: 32 y.o.  Sex: female  :  1990  MRN: [id number]                     Primary Care Physician: Provider, No Known    Chief Complaint:  32 year old female who presented to the emergency room with shortness of breath, cough and congestion; she was admitted recently with similar problems and felt better at discharged but started getting worse again about a week ago; she has had a cough, wheezing and is very short of air with minimal exertion; oxygen sats were in the 80s at home; she has also had diarrhea and weight loss    History of Present Illness:   As above    Past Medical History:  History reviewed. No pertinent past medical history.  Past Surgical History:  History reviewed. No pertinent surgical history.   Home Meds:  Medications Prior to Admission   Medication Sig Dispense Refill Last Dose   • predniSONE (DELTASONE) 10 MG tablet Take 4 tablets by mouth Daily With Breakfast for 5 days, THEN 3.5 tablets Daily With Breakfast for 7 days, THEN 3 tablets Daily With Breakfast for 7 days, THEN 2.5 tablets Daily With Breakfast for 7 days, THEN 2 tablets Daily With Breakfast for 5 days, THEN 1 tablet Daily With Breakfast for 5 days, THEN 0.5 tablets Daily With Breakfast for 5 days. 101 tablet 0        Allergies:  Allergies   Allergen Reactions   • Banana Itching   • Mushroom Provider Review Needed     Immunizations:    There is no immunization history on file for this patient.  Social History:   Social History     Social History Narrative   • Not on file     Social History     Socioeconomic History   • Marital status: Single   Tobacco Use   • Smoking status: Never Smoker   • Smokeless tobacco: Never Used   Vaping Use   • Vaping Use: Some days   • Devices: started vapping last month, has stopped since SOA started   Substance and Sexual Activity   • Alcohol use: Yes     Comment: [facility]  "  • Drug use: Never   • Sexual activity: Defer       Family History:  History reviewed. No pertinent family history.     Review of Systems  See history of present illness and past medical history.  Patient denies headache, dizziness, syncope, falls, trauma, change in vision, change in hearing,   focal weakness, numbness, or paresthesia.  Patient denies chest pain, palpitations,  orthopnea, PND sinus pressure, rhinorrhea, epistaxis, hemoptysis, nausea, vomiting,hematemesis, diarrhea, constipation or hematchezia.  Denies cold or heat intolerance, polydipsia, polyuria, polyphagia. Denies hematuria, pyuria, dysuria, hesitancy, frequency or urgency. Denies consumption of raw and under cooked meats foods or change in water source.  Denies fever, chills, sweats, night sweats.  Denies missing any routine medications. Remainder of ROS is negative.    Objective:  T Max 24 hrs: Temp (24hrs), Av.1 °F (37.3 °C), Min:98.6 °F (37 °C), Max:99.5 °F (37.5 °C)    Vitals Ranges:   Temp:  [98.6 °F (37 °C)-99.5 °F (37.5 °C)] 98.6 °F (37 °C)  Heart Rate:  [] 101  Resp:  [16-20] 20  BP: (103-132)/(74-93) 117/83      Exam:  /83 (BP Location: Right arm, Patient Position: Sitting)   Pulse 101   Temp 98.6 °F (37 °C) (Oral)   Resp 20   Ht 162.6 cm (64\")   Wt 59.1 kg (130 lb 4.8 oz)   SpO2 93%   BMI 22.37 kg/m²     General Appearance:    Alert, cooperative, no distress, appears stated age; thin, chronically ill appearing   Head:    Normocephalic, without obvious abnormality, atraumatic   Eyes:    PERRL, conjunctivae/corneas clear, EOM's intact, both eyes   Ears:    Normal external ear canals, both ears   Nose:   Nares normal, septum midline, mucosa normal, no drainage    or sinus tenderness   Throat:   Lips, mucosa, and tongue normal   Neck:   Supple, symmetrical, trachea midline, no adenopathy;     thyroid:  no enlargement/tenderness/nodules; no carotid    bruit or JVD   Back:     Symmetric, no curvature, ROM normal, no " CVA tenderness   Lungs:     Clear to auscultation bilaterally, respirations unlabored   Chest Wall:    No tenderness or deformity    Heart:    Regular rate and rhythm, S1 and S2 normal, no murmur, rub   or gallop   Abdomen:     Soft, nontender, bowel sounds active all four quadrants,     no masses, no hepatomegaly, no splenomegaly   Extremities:   Extremities normal, atraumatic, no cyanosis or edema   Pulses:   2+ and symmetric all extremities   Skin:   Skin color, texture, turgor normal, no rashes or lesions   Lymph nodes:   Cervical, supraclavicular, and axillary nodes normal   Neurologic:   CNII-XII intact, normal strength, sensation intact throughout      .    Data Review:  Labs in chart were reviewed.  WBC   Date Value Ref Range Status   08/08/2022 4.26 3.40 - 10.80 10*3/mm3 Final     Hemoglobin   Date Value Ref Range Status   08/08/2022 12.1 12.0 - 15.9 g/dL Final     Hematocrit   Date Value Ref Range Status   08/08/2022 37.0 34.0 - 46.6 % Final     Platelets   Date Value Ref Range Status   08/08/2022 278 140 - 450 10*3/mm3 Final     Sodium   Date Value Ref Range Status   08/08/2022 137 136 - 145 mmol/L Final     Potassium   Date Value Ref Range Status   08/08/2022 3.5 3.5 - 5.2 mmol/L Final     Chloride   Date Value Ref Range Status   08/08/2022 99 98 - 107 mmol/L Final     CO2   Date Value Ref Range Status   08/08/2022 23.3 22.0 - 29.0 mmol/L Final     BUN   Date Value Ref Range Status   08/08/2022 12 6 - 20 mg/dL Final     Creatinine   Date Value Ref Range Status   08/08/2022 0.72 0.57 - 1.00 mg/dL Final     Glucose   Date Value Ref Range Status   08/08/2022 100 (H) 65 - 99 mg/dL Final     Calcium   Date Value Ref Range Status   08/08/2022 8.9 8.6 - 10.5 mg/dL Final     AST (SGOT)   Date Value Ref Range Status   08/08/2022 33 (H) 1 - 32 U/L Final     ALT (SGPT)   Date Value Ref Range Status   08/08/2022 17 1 - 33 U/L Final     Alkaline Phosphatase   Date Value Ref Range Status   08/08/2022 54 39 - 117 U/L  Final                Imaging Results (All)     Procedure Component Value Units Date/Time    CT Angiogram Chest [[id number]] Collected: 08/08/22 1711     Updated: 08/08/22 1711    Narrative:      CT ANGIOGRAM OF THE CHEST WITH CONTRAST     HISTORY: 32-year-old female with a history of shortness of air, weight  loss and elevated d-dimer, rule out pulmonary embolism.      TECHNIQUE: Contiguous axial images were obtained through the chest  following bolus intravenous administration of nonionic contrast. Three-D  reformatted images were produced and presented for interpretation.     COMPARISON: CT angiogram of the chest with contrast, 07/09/2022.     FINDINGS: No filling defects are seen within the pulmonary arterial  system. The RV to LV ratio is less than 1. Reidentified are multiple  opacities seen diffusely throughout the bilateral upper lobes, bilateral  lower lobes, right middle lobe and lingula. Improved aeration with a  reduction in opacification is noted within the bilateral upper lobes and  the infiltrates in this region have a more ground glass appearance. The  osseous structures appear normal. The heart and great vessels appear  normal. The visualized soft tissue structures of the upper abdomen  appear normal.       Impression:      1. There is no evidence for a pulmonary embolism.   2. There has been mild interval improvement in multilobar bilateral  pneumonia, particularly within the bilateral upper lobes.     These findings were communicated to Sanket Youngblood MD, in the emergency  room on 08/08/2022 at 4:50 PM.     Radiation dose reduction techniques were utilized, including automated  exposure control and exposure modulation based on body size.          XR Chest 1 View [[id number]] Collected: 08/08/22 1523     Updated: 08/08/22 1528    Narrative:      PORTAL CHEST X-RAY     HISTORY: Shortness of breath.     TECHNIQUE: Portable chest x-ray is provided and correlated with chest CT  July 9, 2022.     FINDINGS:  Bony vasculature is engorged and there is increased density in  the mid and lower lung zones. This is favored represent florid pulmonary  edema. No pleural effusion evident. No pneumothorax.       Impression:      Symmetric opacity in the mid and lower lung zones  bilaterally favored represent bilateral pulmonary edema.     This report was finalized on 8/8/2022 3:25 PM by Dr. Marvin Gautam M.D.               Assessment:  Active Hospital Problems    Diagnosis  POA   • Pneumonia of both lungs due to infectious organism, unspecified part of lung [J18.9]  Yes      Resolved Hospital Problems   No resolved problems to display.   acute hypoxic respiratory failure  Weight loss    Plan:  Continue steroids, antibiotics  Ask pulmonary to see her  Monitor on telemetry  Wean oxygen as tolerated  She was sent home with it but had improved soa so she stopped using it  Notes and labs reviewed    Luz Maria Gagnon MD  8/8/2022  20:22 EDT

## 2022-08-09 NOTE — PROGRESS NOTES
"[facility] Clinical Pharmacy Services: Vancomycin Pharmacokinetic Initial Consult Note    Uri Mirza is a 32 y.o. female who is on day 1 of pharmacy to dose vancomycin.    Indication: Sepsis  Consulting Provider: Dr. Gagnon  Planned Duration of Therapy: 5 days  Loading Dose Ordered or Given: 1250 mg on 8/8 at 1829  Culture/Source:   8/8 Blood culture in process  Target: -600 mg/L.hr   Other Antimicrobials: N/A (will reach out to provider to ensure gram negative coverage not needed)    Vitals/Labs  Ht: 162.6 cm (64\"); Wt: 59.1 kg (130 lb 4.8 oz)  Temp Readings from Last 1 Encounters:   08/08/22 98.9 °F (37.2 °C) (Oral)    Estimated Creatinine Clearance: 104.7 mL/min (by C-G formula based on SCr of 0.72 mg/dL).     Results from last 7 days   Lab Units 08/08/22  1514   CREATININE mg/dL 0.72   WBC 10*3/mm3 4.26     Assessment/Plan:    Vancomycin Dose:   1000 mg IV every  12  hours  Predictive AUC level for the dose ordered is 493 mg/L.hr, which is within the target of 400-600 mg/L.hr  Vanc Trough has been ordered for 8/10 at 0530     Pharmacy will follow patient's kidney function and will adjust doses and obtain levels as necessary. Thank you for involving pharmacy in this patient's care. Please contact pharmacy with any questions or concerns.                           Alban Galeas III, [facility]  Clinical Pharmacist    "

## 2022-08-10 ENCOUNTER — ANESTHESIA (OUTPATIENT)
Dept: GASTROENTEROLOGY | Facility: HOSPITAL | Age: 32
End: 2022-08-10

## 2022-08-10 ENCOUNTER — APPOINTMENT (OUTPATIENT)
Dept: GENERAL RADIOLOGY | Facility: HOSPITAL | Age: 32
End: 2022-08-10

## 2022-08-10 ENCOUNTER — ANESTHESIA EVENT (OUTPATIENT)
Dept: GASTROENTEROLOGY | Facility: HOSPITAL | Age: 32
End: 2022-08-10

## 2022-08-10 ENCOUNTER — APPOINTMENT (OUTPATIENT)
Dept: CARDIOLOGY | Facility: HOSPITAL | Age: 32
End: 2022-08-10

## 2022-08-10 LAB
ANION GAP SERPL CALCULATED.3IONS-SCNC: 8 MMOL/L (ref 5–15)
AORTIC DIMENSIONLESS INDEX: 0.7 (DI)
APPEARANCE FLD: ABNORMAL
ASCENDING AORTA: 2.5 CM
B PARAPERT DNA SPEC QL NAA+PROBE: NOT DETECTED
B PERT DNA SPEC QL NAA+PROBE: NOT DETECTED
BH CV ECHO MEAS - AO MAX PG: 5.4 MMHG
BH CV ECHO MEAS - AO MEAN PG: 2.9 MMHG
BH CV ECHO MEAS - AO V2 MAX: 116.2 CM/SEC
BH CV ECHO MEAS - AO V2 VTI: 24.7 CM
BH CV ECHO MEAS - AVA(I,D): 2.8 CM2
BH CV ECHO MEAS - EDV(CUBED): 92.6 ML
BH CV ECHO MEAS - EDV(MOD-SP2): 117 ML
BH CV ECHO MEAS - EDV(MOD-SP4): 92 ML
BH CV ECHO MEAS - EF(MOD-BP): 57 %
BH CV ECHO MEAS - EF(MOD-SP2): 63.2 %
BH CV ECHO MEAS - EF(MOD-SP4): 52.2 %
BH CV ECHO MEAS - ESV(CUBED): 32.1 ML
BH CV ECHO MEAS - ESV(MOD-SP2): 43 ML
BH CV ECHO MEAS - ESV(MOD-SP4): 44 ML
BH CV ECHO MEAS - FS: 29.7 %
BH CV ECHO MEAS - IVS/LVPW: 1.12 CM
BH CV ECHO MEAS - IVSD: 0.84 CM
BH CV ECHO MEAS - LAT PEAK E' VEL: 14.5 CM/SEC
BH CV ECHO MEAS - LV DIASTOLIC VOL/BSA (35-75): 56.5 CM2
BH CV ECHO MEAS - LV MASS(C)D: 114.7 GRAMS
BH CV ECHO MEAS - LV MAX PG: 2.46 MMHG
BH CV ECHO MEAS - LV MEAN PG: 1.34 MMHG
BH CV ECHO MEAS - LV SYSTOLIC VOL/BSA (12-30): 27 CM2
BH CV ECHO MEAS - LV V1 MAX: 78.4 CM/SEC
BH CV ECHO MEAS - LV V1 VTI: 16.5 CM
BH CV ECHO MEAS - LVIDD: 4.5 CM
BH CV ECHO MEAS - LVIDS: 3.2 CM
BH CV ECHO MEAS - LVOT AREA: 4.1 CM2
BH CV ECHO MEAS - LVOT DIAM: 2.29 CM
BH CV ECHO MEAS - LVPWD: 0.76 CM
BH CV ECHO MEAS - MED PEAK E' VEL: 11.3 CM/SEC
BH CV ECHO MEAS - MV A MAX VEL: 57.4 CM/SEC
BH CV ECHO MEAS - MV DEC SLOPE: 432.7 CM/SEC2
BH CV ECHO MEAS - MV DEC TIME: 180 MSEC
BH CV ECHO MEAS - MV E MAX VEL: 99.8 CM/SEC
BH CV ECHO MEAS - MV E/A: 1.74
BH CV ECHO MEAS - MV MAX PG: 3.5 MMHG
BH CV ECHO MEAS - MV MEAN PG: 1.37 MMHG
BH CV ECHO MEAS - MV P1/2T: 63.5 MSEC
BH CV ECHO MEAS - MV V2 VTI: 27.3 CM
BH CV ECHO MEAS - MVA(P1/2T): 3.5 CM2
BH CV ECHO MEAS - MVA(VTI): 2.5 CM2
BH CV ECHO MEAS - PA V2 MAX: 86.9 CM/SEC
BH CV ECHO MEAS - RAP SYSTOLE: 3 MMHG
BH CV ECHO MEAS - RV MAX PG: 1.93 MMHG
BH CV ECHO MEAS - RV V1 MAX: 69.4 CM/SEC
BH CV ECHO MEAS - RV V1 VTI: 18.4 CM
BH CV ECHO MEAS - RVSP: 23 MMHG
BH CV ECHO MEAS - SI(MOD-SP2): 45.4 ML/M2
BH CV ECHO MEAS - SI(MOD-SP4): 29.5 ML/M2
BH CV ECHO MEAS - SV(LVOT): 68 ML
BH CV ECHO MEAS - SV(MOD-SP2): 74 ML
BH CV ECHO MEAS - SV(MOD-SP4): 48 ML
BH CV ECHO MEAS - TAPSE (>1.6): 2.22 CM
BH CV ECHO MEAS - TR MAX PG: 19.8 MMHG
BH CV ECHO MEAS - TR MAX VEL: 222.6 CM/SEC
BH CV ECHO MEASUREMENTS AVERAGE E/E' RATIO: 7.74
BH CV XLRA - RV BASE: 3.2 CM
BH CV XLRA - TDI S': 12.7 CM/SEC
BILIRUB UR QL STRIP: NEGATIVE
BUN SERPL-MCNC: 14 MG/DL (ref 6–20)
BUN/CREAT SERPL: 21.2 (ref 7–25)
C PNEUM DNA NPH QL NAA+NON-PROBE: NOT DETECTED
CALCIUM SPEC-SCNC: 8.9 MG/DL (ref 8.6–10.5)
CHLORIDE SERPL-SCNC: 107 MMOL/L (ref 98–107)
CHROMATIN AB SERPL-ACNC: <10 IU/ML (ref 0–14)
CLARITY UR: CLEAR
CO2 SERPL-SCNC: 23 MMOL/L (ref 22–29)
COLOR FLD: COLORLESS
COLOR UR: YELLOW
CREAT SERPL-MCNC: 0.66 MG/DL (ref 0.57–1)
CRYPTOC AG CSF QL: NEGATIVE
DEPRECATED RDW RBC AUTO: 40.2 FL (ref 37–54)
EGFRCR SERPLBLD CKD-EPI 2021: 119.7 ML/MIN/1.73
EOSINOPHIL NFR FLD MANUAL: 2 %
ERYTHROCYTE [DISTWIDTH] IN BLOOD BY AUTOMATED COUNT: 13.6 % (ref 12.3–15.4)
FLUAV SUBTYP SPEC NAA+PROBE: NOT DETECTED
FLUBV RNA ISLT QL NAA+PROBE: NOT DETECTED
FOLATE SERPL-MCNC: 6.16 NG/ML (ref 4.78–24.2)
GLUCOSE SERPL-MCNC: 157 MG/DL (ref 65–99)
GLUCOSE UR STRIP-MCNC: NEGATIVE MG/DL
HADV DNA SPEC NAA+PROBE: NOT DETECTED
HCOV 229E RNA SPEC QL NAA+PROBE: NOT DETECTED
HCOV HKU1 RNA SPEC QL NAA+PROBE: NOT DETECTED
HCOV NL63 RNA SPEC QL NAA+PROBE: NOT DETECTED
HCOV OC43 RNA SPEC QL NAA+PROBE: NOT DETECTED
HCT VFR BLD AUTO: 33.1 % (ref 34–46.6)
HCV AB SER DONR QL: NORMAL
HGB BLD-MCNC: 10.2 G/DL (ref 12–15.9)
HGB UR QL STRIP.AUTO: NEGATIVE
HIV1 P24 AG SER QL: ABNORMAL
HIV1+2 AB SER QL: REACTIVE
HMPV RNA NPH QL NAA+NON-PROBE: NOT DETECTED
HPIV1 RNA ISLT QL NAA+PROBE: NOT DETECTED
HPIV2 RNA SPEC QL NAA+PROBE: NOT DETECTED
HPIV3 RNA NPH QL NAA+PROBE: NOT DETECTED
HPIV4 P GENE NPH QL NAA+PROBE: NOT DETECTED
IRON 24H UR-MRATE: 59 MCG/DL (ref 37–145)
IRON SATN MFR SERPL: 25 % (ref 20–50)
KETONES UR QL STRIP: NEGATIVE
LEUKOCYTE ESTERASE UR QL STRIP.AUTO: NEGATIVE
LYMPHOCYTES NFR FLD MANUAL: 46 %
M PNEUMO IGG SER IA-ACNC: NOT DETECTED
MAXIMAL PREDICTED HEART RATE: 188 BPM
MCH RBC QN AUTO: 25.1 PG (ref 26.6–33)
MCHC RBC AUTO-ENTMCNC: 30.8 G/DL (ref 31.5–35.7)
MCV RBC AUTO: 81.5 FL (ref 79–97)
METHOD: ABNORMAL
MONOS+MACROS NFR FLD: 40 %
MRSA DNA SPEC QL NAA+PROBE: ABNORMAL
NEUTROPHILS NFR FLD MANUAL: 12 %
NITRITE UR QL STRIP: NEGATIVE
NUC CELL # FLD: 140 /MM3
PH UR STRIP.AUTO: 7 [PH] (ref 5–8)
PLATELET # BLD AUTO: 262 10*3/MM3 (ref 140–450)
PMV BLD AUTO: 11.3 FL (ref 6–12)
POTASSIUM SERPL-SCNC: 4.6 MMOL/L (ref 3.5–5.2)
PROT UR QL STRIP: NEGATIVE
RBC # BLD AUTO: 4.06 10*6/MM3 (ref 3.77–5.28)
RBC # FLD AUTO: 269 /MM3
RHINOVIRUS RNA SPEC NAA+PROBE: NOT DETECTED
RPR SER QL: NORMAL
RSV RNA NPH QL NAA+NON-PROBE: NOT DETECTED
SARS-COV-2 RNA NPH QL NAA+NON-PROBE: NOT DETECTED
SINUS: 2.8 CM
SODIUM SERPL-SCNC: 138 MMOL/L (ref 136–145)
SP GR UR STRIP: 1.02 (ref 1–1.03)
STRESS TARGET HR: 160 BPM
TIBC SERPL-MCNC: 232 MCG/DL (ref 298–536)
TRANSFERRIN SERPL-MCNC: 156 MG/DL (ref 200–360)
UROBILINOGEN UR QL STRIP: NORMAL
VANCOMYCIN TROUGH SERPL-MCNC: 4.3 MCG/ML (ref 5–20)
VIT B12 BLD-MCNC: 319 PG/ML (ref 211–946)
WBC NRBC COR # BLD: 8.72 10*3/MM3 (ref 3.4–10.8)

## 2022-08-10 PROCEDURE — 87385 HISTOPLASMA CAPSUL AG IA: CPT | Performed by: INTERNAL MEDICINE

## 2022-08-10 PROCEDURE — 25010000002 CEFEPIME PER 500 MG: Performed by: NURSE PRACTITIONER

## 2022-08-10 PROCEDURE — 86235 NUCLEAR ANTIGEN ANTIBODY: CPT | Performed by: INTERNAL MEDICINE

## 2022-08-10 PROCEDURE — 87899 AGENT NOS ASSAY W/OPTIC: CPT | Performed by: HOSPITALIST

## 2022-08-10 PROCEDURE — 80048 BASIC METABOLIC PNL TOTAL CA: CPT | Performed by: HOSPITALIST

## 2022-08-10 PROCEDURE — 87176 TISSUE HOMOGENIZATION CULTR: CPT | Performed by: INTERNAL MEDICINE

## 2022-08-10 PROCEDURE — 87070 CULTURE OTHR SPECIMN AEROBIC: CPT | Performed by: INTERNAL MEDICINE

## 2022-08-10 PROCEDURE — 89051 BODY FLUID CELL COUNT: CPT | Performed by: INTERNAL MEDICINE

## 2022-08-10 PROCEDURE — 83516 IMMUNOASSAY NONANTIBODY: CPT | Performed by: INTERNAL MEDICINE

## 2022-08-10 PROCEDURE — 86003 ALLG SPEC IGE CRUDE XTRC EA: CPT | Performed by: INTERNAL MEDICINE

## 2022-08-10 PROCEDURE — 87491 CHLMYD TRACH DNA AMP PROBE: CPT | Performed by: INTERNAL MEDICINE

## 2022-08-10 PROCEDURE — 87903 PHENOTYPE DNA HIV W/CULTURE: CPT | Performed by: INTERNAL MEDICINE

## 2022-08-10 PROCEDURE — 0B9F8ZX DRAINAGE OF RIGHT LOWER LUNG LOBE, VIA NATURAL OR ARTIFICIAL OPENING ENDOSCOPIC, DIAGNOSTIC: ICD-10-PCS | Performed by: INTERNAL MEDICINE

## 2022-08-10 PROCEDURE — 81003 URINALYSIS AUTO W/O SCOPE: CPT | Performed by: INTERNAL MEDICINE

## 2022-08-10 PROCEDURE — 80202 ASSAY OF VANCOMYCIN: CPT | Performed by: INTERNAL MEDICINE

## 2022-08-10 PROCEDURE — 88312 SPECIAL STAINS GROUP 1: CPT | Performed by: INTERNAL MEDICINE

## 2022-08-10 PROCEDURE — 85027 COMPLETE CBC AUTOMATED: CPT | Performed by: HOSPITALIST

## 2022-08-10 PROCEDURE — 84466 ASSAY OF TRANSFERRIN: CPT | Performed by: HOSPITALIST

## 2022-08-10 PROCEDURE — 87205 SMEAR GRAM STAIN: CPT | Performed by: INTERNAL MEDICINE

## 2022-08-10 PROCEDURE — 99223 1ST HOSP IP/OBS HIGH 75: CPT | Performed by: INTERNAL MEDICINE

## 2022-08-10 PROCEDURE — 87071 CULTURE AEROBIC QUANT OTHER: CPT | Performed by: INTERNAL MEDICINE

## 2022-08-10 PROCEDURE — 93306 TTE W/DOPPLER COMPLETE: CPT

## 2022-08-10 PROCEDURE — 87904 PHENOTYPE DNA HIV W/CLT ADD: CPT | Performed by: INTERNAL MEDICINE

## 2022-08-10 PROCEDURE — 25010000002 METHYLPREDNISOLONE PER 40 MG: Performed by: INTERNAL MEDICINE

## 2022-08-10 PROCEDURE — 82607 VITAMIN B-12: CPT | Performed by: HOSPITALIST

## 2022-08-10 PROCEDURE — 82746 ASSAY OF FOLIC ACID SERUM: CPT | Performed by: HOSPITALIST

## 2022-08-10 PROCEDURE — 71045 X-RAY EXAM CHEST 1 VIEW: CPT

## 2022-08-10 PROCEDURE — 88112 CYTOPATH CELL ENHANCE TECH: CPT | Performed by: INTERNAL MEDICINE

## 2022-08-10 PROCEDURE — 88305 TISSUE EXAM BY PATHOLOGIST: CPT | Performed by: INTERNAL MEDICINE

## 2022-08-10 PROCEDURE — 87206 SMEAR FLUORESCENT/ACID STAI: CPT | Performed by: INTERNAL MEDICINE

## 2022-08-10 PROCEDURE — 87900 PHENOTYPE INFECT AGENT DRUG: CPT | Performed by: INTERNAL MEDICINE

## 2022-08-10 PROCEDURE — 93306 TTE W/DOPPLER COMPLETE: CPT | Performed by: INTERNAL MEDICINE

## 2022-08-10 PROCEDURE — 86200 CCP ANTIBODY: CPT | Performed by: INTERNAL MEDICINE

## 2022-08-10 PROCEDURE — 87536 HIV-1 QUANT&REVRSE TRNSCRPJ: CPT | Performed by: INTERNAL MEDICINE

## 2022-08-10 PROCEDURE — 25010000002 ENOXAPARIN PER 10 MG: Performed by: HOSPITALIST

## 2022-08-10 PROCEDURE — 83540 ASSAY OF IRON: CPT | Performed by: HOSPITALIST

## 2022-08-10 PROCEDURE — 0202U NFCT DS 22 TRGT SARS-COV-2: CPT | Performed by: INTERNAL MEDICINE

## 2022-08-10 PROCEDURE — 87116 MYCOBACTERIA CULTURE: CPT | Performed by: INTERNAL MEDICINE

## 2022-08-10 PROCEDURE — 76000 FLUOROSCOPY <1 HR PHYS/QHP: CPT

## 2022-08-10 PROCEDURE — G0432 EIA HIV-1/HIV-2 SCREEN: HCPCS | Performed by: HOSPITALIST

## 2022-08-10 PROCEDURE — 86037 ANCA TITER EACH ANTIBODY: CPT | Performed by: INTERNAL MEDICINE

## 2022-08-10 PROCEDURE — 86702 HIV-2 ANTIBODY: CPT | Performed by: HOSPITALIST

## 2022-08-10 PROCEDURE — 87901 NFCT AGT GNTYP ALYS HIV1 REV: CPT | Performed by: INTERNAL MEDICINE

## 2022-08-10 PROCEDURE — 25010000002 VANCOMYCIN PER 500 MG: Performed by: INTERNAL MEDICINE

## 2022-08-10 PROCEDURE — 87449 NOS EACH ORGANISM AG IA: CPT | Performed by: INTERNAL MEDICINE

## 2022-08-10 PROCEDURE — 25010000002 PROPOFOL 10 MG/ML EMULSION: Performed by: ANESTHESIOLOGY

## 2022-08-10 PROCEDURE — 86225 DNA ANTIBODY NATIVE: CPT | Performed by: INTERNAL MEDICINE

## 2022-08-10 PROCEDURE — 86701 HIV-1ANTIBODY: CPT | Performed by: HOSPITALIST

## 2022-08-10 PROCEDURE — 87591 N.GONORRHOEAE DNA AMP PROB: CPT | Performed by: INTERNAL MEDICINE

## 2022-08-10 PROCEDURE — 87102 FUNGUS ISOLATION CULTURE: CPT | Performed by: INTERNAL MEDICINE

## 2022-08-10 RX ORDER — LIDOCAINE HYDROCHLORIDE 20 MG/ML
INJECTION, SOLUTION INFILTRATION; PERINEURAL AS NEEDED
Status: DISCONTINUED | OUTPATIENT
Start: 2022-08-10 | End: 2022-08-10 | Stop reason: SURG

## 2022-08-10 RX ORDER — SODIUM CHLORIDE, SODIUM LACTATE, POTASSIUM CHLORIDE, CALCIUM CHLORIDE 600; 310; 30; 20 MG/100ML; MG/100ML; MG/100ML; MG/100ML
INJECTION, SOLUTION INTRAVENOUS CONTINUOUS PRN
Status: DISCONTINUED | OUTPATIENT
Start: 2022-08-10 | End: 2022-08-10 | Stop reason: SURG

## 2022-08-10 RX ORDER — SULFAMETHOXAZOLE AND TRIMETHOPRIM 800; 160 MG/1; MG/1
2 TABLET ORAL EVERY 8 HOURS
Status: DISCONTINUED | OUTPATIENT
Start: 2022-08-10 | End: 2022-08-13 | Stop reason: HOSPADM

## 2022-08-10 RX ORDER — PROMETHAZINE HYDROCHLORIDE 25 MG/1
25 TABLET ORAL ONCE AS NEEDED
Status: DISCONTINUED | OUTPATIENT
Start: 2022-08-10 | End: 2022-08-10 | Stop reason: HOSPADM

## 2022-08-10 RX ORDER — LIDOCAINE HYDROCHLORIDE 10 MG/ML
INJECTION, SOLUTION EPIDURAL; INFILTRATION; INTRACAUDAL; PERINEURAL AS NEEDED
Status: DISCONTINUED | OUTPATIENT
Start: 2022-08-10 | End: 2022-08-10 | Stop reason: HOSPADM

## 2022-08-10 RX ORDER — PROPOFOL 10 MG/ML
VIAL (ML) INTRAVENOUS CONTINUOUS PRN
Status: DISCONTINUED | OUTPATIENT
Start: 2022-08-10 | End: 2022-08-10 | Stop reason: SURG

## 2022-08-10 RX ORDER — SODIUM CHLORIDE 9 MG/ML
30 INJECTION, SOLUTION INTRAVENOUS CONTINUOUS PRN
Status: DISCONTINUED | OUTPATIENT
Start: 2022-08-10 | End: 2022-08-13 | Stop reason: HOSPADM

## 2022-08-10 RX ORDER — PROPOFOL 10 MG/ML
VIAL (ML) INTRAVENOUS AS NEEDED
Status: DISCONTINUED | OUTPATIENT
Start: 2022-08-10 | End: 2022-08-10 | Stop reason: SURG

## 2022-08-10 RX ORDER — LIDOCAINE HYDROCHLORIDE 20 MG/ML
INJECTION, SOLUTION EPIDURAL; INFILTRATION; INTRACAUDAL; PERINEURAL AS NEEDED
Status: DISCONTINUED | OUTPATIENT
Start: 2022-08-10 | End: 2022-08-10 | Stop reason: HOSPADM

## 2022-08-10 RX ORDER — PROMETHAZINE HYDROCHLORIDE 25 MG/1
25 SUPPOSITORY RECTAL ONCE AS NEEDED
Status: DISCONTINUED | OUTPATIENT
Start: 2022-08-10 | End: 2022-08-10 | Stop reason: HOSPADM

## 2022-08-10 RX ADMIN — METHYLPREDNISOLONE SODIUM SUCCINATE 40 MG: 40 INJECTION, POWDER, FOR SOLUTION INTRAMUSCULAR; INTRAVENOUS at 20:47

## 2022-08-10 RX ADMIN — ENOXAPARIN SODIUM 40 MG: 100 INJECTION SUBCUTANEOUS at 20:47

## 2022-08-10 RX ADMIN — SODIUM CHLORIDE, POTASSIUM CHLORIDE, SODIUM LACTATE AND CALCIUM CHLORIDE: 600; 310; 30; 20 INJECTION, SOLUTION INTRAVENOUS at 15:30

## 2022-08-10 RX ADMIN — Medication 200 MCG/KG/MIN: at 15:34

## 2022-08-10 RX ADMIN — VANCOMYCIN HYDROCHLORIDE 1000 MG: 1 INJECTION, SOLUTION INTRAVENOUS at 08:20

## 2022-08-10 RX ADMIN — METHYLPREDNISOLONE SODIUM SUCCINATE 40 MG: 40 INJECTION, POWDER, FOR SOLUTION INTRAMUSCULAR; INTRAVENOUS at 17:36

## 2022-08-10 RX ADMIN — SULFAMETHOXAZOLE AND TRIMETHOPRIM 2 TABLET: 800; 160 TABLET ORAL at 20:47

## 2022-08-10 RX ADMIN — PROPOFOL 200 MG: 10 INJECTION, EMULSION INTRAVENOUS at 15:34

## 2022-08-10 RX ADMIN — METHYLPREDNISOLONE SODIUM SUCCINATE 40 MG: 40 INJECTION, POWDER, FOR SOLUTION INTRAMUSCULAR; INTRAVENOUS at 05:41

## 2022-08-10 RX ADMIN — LIDOCAINE HYDROCHLORIDE 100 MG: 20 INJECTION, SOLUTION INFILTRATION; PERINEURAL at 15:34

## 2022-08-10 RX ADMIN — CEFEPIME 2 G: 2 INJECTION, POWDER, FOR SOLUTION INTRAVENOUS at 05:41

## 2022-08-10 RX ADMIN — SULFAMETHOXAZOLE AND TRIMETHOPRIM 2 TABLET: 800; 160 TABLET ORAL at 17:35

## 2022-08-10 NOTE — ANESTHESIA PROCEDURE NOTES
Airway  Urgency: elective    Date/Time: 8/10/2022 3:36 PM    General Information and Staff    Patient location during procedure: OR  Anesthesiologist: Richard Man MD    Indications and Patient Condition  Indications for airway management: airway protection    Preoxygenated: yes  MILS maintained throughout  Mask difficulty assessment: 0 - not attempted    Final Airway Details  Final airway type: supraglottic airway      Successful airway: ProSeal  Size 4    Number of attempts at approach: 1  Assessment: lips, teeth, and gum same as pre-op and atraumatic intubation

## 2022-08-10 NOTE — PROGRESS NOTES
"[facility] Clinical Pharmacy Services: Vancomycin Monitoring Note    Uri Mirza is a 32 y.o. female who is on day 2/5 of pharmacy to dose vancomycin for Sepsis.    Previous Vancomycin Dose:   1000 mg IV every 12 hours  Updated Cultures and Sensitivities:   MRSA PCR MRSA Detected Abnormal      Results from last 7 days   Lab Units 08/10/22  0535   VANCOMYCIN TR mcg/mL 4.30*     Vitals/Labs  Ht: 162.6 cm (64\"); Wt: 60.9 kg (134 lb 3.2 oz)   Temp Readings from Last 1 Encounters:   08/10/22 97.4 °F (36.3 °C) (Oral)     Estimated Creatinine Clearance: 117.6 mL/min (by C-G formula based on SCr of 0.66 mg/dL).     Results from last 7 days   Lab Units 08/10/22  0535 08/09/22  0735 08/08/22  1514   CREATININE mg/dL 0.66 0.62 0.72   WBC 10*3/mm3 8.72 3.88 4.26     Assessment/Plan    Current Vancomycin Dose: Level was low this am at 4.3 mcg/ml therefore change to 1000 mg IV every 8 hours; provides a predicted  mg/L.hr   Next Level Date and Time: Vanc Trough on 8/11/22 at 0730  We will continue to monitor patient changes and renal function     Thank you for involving pharmacy in this patient's care. Please contact pharmacy with any questions or concerns.       Rigo Nash, [facility]  Clinical Pharmacist         "

## 2022-08-10 NOTE — PLAN OF CARE
Goal Outcome Evaluation:  Plan of Care Reviewed With: patient      No c/o pain n/v or soa this shift. Patient on 3L prior to bronch, on 4L since tolerating well, will try to wean. Remains stable

## 2022-08-10 NOTE — CONSULTS
CONSULT NOTE    Patient Identification:  Uri Mirza  32 y.o.  female  1990  [id number]            Requesting physician: Dr. Artemio Mendoza    Reason for Consultation: Bilateral pulmonary traits acute hypoxemic respiratory failure    CC: Shortness of breath    History of Present Illness:  Patient is a 32-year-old who is a history of smoking and a electronic cigarettes however stopped these 3 weeks prior to her prior admission about a month ago who presented with shortness of breath that has been ongoing over the past month or 2-worsening over the past week to 3 days.  She was found to be hypoxemic in the ER.  She does describe cough sputum production some fevers and chills weight loss.  Denies any pleuritic chest pain or hemoptysis.  She was evaluated about a month ago as an inpatient with chest x-ray and CT imaging consistent with NSIP/ILD.  She was discharged on prednisone however her Fungitell a beta D glucan came back significant elevated as well as an c-ANCA of 1-1 60.  Some provider called and had her start itraconazole and stop the prednisone.  She did this however noted GI upset nausea vomiting with active itraconazole and subsequently stopped it.  She has been off prednisone and oxiconazole for weeks now.    Denies history of e-cigarette smoking anything over the past month to 2 months.  No history of autoimmune disease no family history of autoimmune disease      Prior work-up reviewed includes  Hypersensitivity pneumonitis but panel negative  ANCA panel positive for c-ANCA of 1:160  Fungitell BD Glycan greater than 500  Trop neg  pbnp 14.7  Procal 0.08    Review of Systems:  As per HPI otherwise a 12 system review of systems performed and all else negative    Previous medical history none    No pertinent surgical history    Medications Prior to Admission   Medication Sig Dispense Refill Last Dose   • predniSONE (DELTASONE) 10 MG tablet Take 4 tablets by mouth Daily With Breakfast for  "5 days, THEN 3.5 tablets Daily With Breakfast for 7 days, THEN 3 tablets Daily With Breakfast for 7 days, THEN 2.5 tablets Daily With Breakfast for 7 days, THEN 2 tablets Daily With Breakfast for 5 days, THEN 1 tablet Daily With Breakfast for 5 days, THEN 0.5 tablets Daily With Breakfast for 5 days. 101 tablet 0        Allergies   Allergen Reactions   • Banana Itching   • Mushroom Provider Review Needed       Social History     Socioeconomic History   • Marital status: Single   Tobacco Use   • Smoking status: Never Smoker   • Smokeless tobacco: Never Used   Vaping Use   • Vaping Use: Some days   • Substances: Nicotine   • Devices: Disposable, Pre-filled or refillable cartridge, started vapping last month, has stopped since SOA started   Substance and Sexual Activity   • Alcohol use: Yes     Comment: [facility]   • Drug use: Never   • Sexual activity: Defer       History reviewed. No pertinent family history.    Physical Exam:  /97 (BP Location: Left arm, Patient Position: Lying)   Pulse 76   Temp 97.8 °F (36.6 °C) (Oral)   Resp 22   Ht 162.6 cm (64\")   Wt 61.5 kg (135 lb 9.3 oz)   LMP  (LMP Unknown) Comment: not applicable  SpO2 98%   Breastfeeding No   BMI 23.27 kg/m²   Body mass index is 23.27 kg/m².   General appearance: non toxic, conversant   Eyes: anicteric sclerae, moist conjunctivae; no lid-lag;    HENT: Atraumatic; oropharynx clear with moist mucous membranes and no mucosal ulcerations; normal hard and soft palate  Neck: Trachea midline;  supple, no thyromegaly or lymphadenopathy  Lungs: NEGRITO with no rhonchi, faint crackles at bases, with normal respiratory effort and no intercostal retractions  CV: RRR, no rub  Abdomen: thin BS+  Extremities: No peripheral edema or extremity lymphadenopathy  Skin: wwp no diffuse visible rash  Psych: Appropriate affect, alert and oriented  Neuro: cns 2-12 grossly intact moves all ext    LABS:  Results from last 7 days   Lab Units 08/09/22  0735 08/08/22  1514   WBC " 10*3/mm3 3.88 4.26   HEMOGLOBIN g/dL 10.7* 12.1   PLATELETS 10*3/mm3 265 278     Results from last 7 days   Lab Units 08/09/22  0735 08/08/22  1514   SODIUM mmol/L 135* 137   POTASSIUM mmol/L 4.5 3.5   CHLORIDE mmol/L 104 99   CO2 mmol/L 22.0 23.3   BUN mg/dL 12 12   CREATININE mg/dL 0.62 0.72   GLUCOSE mg/dL 166* 100*   CALCIUM mg/dL 8.8 8.9   Estimated Creatinine Clearance: 126.5 mL/min (by C-G formula based on SCr of 0.62 mg/dL).    Imaging: I personally visualized the images of scans/x-rays performed within last 3 days.  Imaging Results (Most Recent)     Procedure Component Value Units Date/Time    CT Angiogram Chest [[id number]] Collected: 08/08/22 1711     Updated: 08/08/22 2116    Narrative:      CT ANGIOGRAM OF THE CHEST WITH CONTRAST     HISTORY: 32-year-old female with a history of shortness of air, weight  loss and elevated d-dimer, rule out pulmonary embolism.      TECHNIQUE: Contiguous axial images were obtained through the chest  following bolus intravenous administration of nonionic contrast. Three-D  reformatted images were produced and presented for interpretation.     COMPARISON: CT angiogram of the chest with contrast, 07/09/2022.     FINDINGS: No filling defects are seen within the pulmonary arterial  system. The RV to LV ratio is less than 1. Reidentified are multiple  opacities seen diffusely throughout the bilateral upper lobes, bilateral  lower lobes, right middle lobe and lingula. Improved aeration with a  reduction in opacification is noted within the bilateral upper lobes and  the infiltrates in this region have a more ground glass appearance. The  osseous structures appear normal. The heart and great vessels appear  normal. The visualized soft tissue structures of the upper abdomen  appear normal.       Impression:      1. There is no evidence for a pulmonary embolism.   2. There has been mild interval improvement in multilobar bilateral  pneumonia, particularly within the bilateral upper  lobes.     These findings were communicated to Sanket Youngblood MD, in the emergency  room on 08/08/2022 at 4:50 PM.     Radiation dose reduction techniques were utilized, including automated  exposure control and exposure modulation based on body size.     This report was finalized on 8/8/2022 9:13 PM by Dr. Billy Manuel M.D.       XR Chest 1 View [[id number]] Collected: 08/08/22 1523     Updated: 08/08/22 1528    Narrative:      PORTAL CHEST X-RAY     HISTORY: Shortness of breath.     TECHNIQUE: Portable chest x-ray is provided and correlated with chest CT  July 9, 2022.     FINDINGS: Bony vasculature is engorged and there is increased density in  the mid and lower lung zones. This is favored represent florid pulmonary  edema. No pleural effusion evident. No pneumothorax.       Impression:      Symmetric opacity in the mid and lower lung zones  bilaterally favored represent bilateral pulmonary edema.     This report was finalized on 8/8/2022 3:25 PM by Dr. Marvin Gautam M.D.             Assessment / Recommendations:  Bilateral Pulmonary Infiltrates c/w ILD  History of of vaping  AHRF    Send racheal jonatan, RF CCP  Resend ANCA panel  Send aspergillus serologies   HIV Pending  Send ua to look for evidence of cardiorenal vasculiitis  Upon review, will also need to ask regarding any cosmetic injections (silicone injections) as there have been case reports of embolization to lungs from   Pending above may need OLB.  Cont cefepime, continue iv methylpred.  NPO for bronchoscopy - will need bal TBBX.    D/w RN.    Complex medical case.  Prior hospital course, labs, imaging reviewed.     Consult called to me at 22:44 , Patient seen at 23:30.    Salvador Kingston MD  [facility]  08/09/22  23:24 EDT

## 2022-08-10 NOTE — PROGRESS NOTES
[facility]  [phone]  Dr. Ricardo Trevino    Subjective:  LOS: 2    Chief Complaint: Dyspnea    Patient is currently upset because of positive HIV diagnosis.  History of note that she was vaping electronics cigarettes for 3 to 4 months up until 1 month ago.  Her symptom onset is about a month ago.  Denies any fever except sometimes at night.  Does not report night sweats.  Mild cough.    Objective   Vital Signs past 24hrs    Temp range: Temp (24hrs), Av.6 °F (36.4 °C), Min:97.4 °F (36.3 °C), Max:97.8 °F (36.6 °C)    BP range: BP: (123-136)/(88-97) 136/97  Pulse range: Heart Rate:  [50-76] 50  Resp rate range: Resp:  [18-22] 22    Device (Oxygen Therapy): nasal cannulaFlow (L/min):  [3] 3  Oxygen range:SpO2:  [98 %] 98 %      60.9 kg (134 lb 3.2 oz); Body mass index is 23.04 kg/m².    Intake/Output Summary (Last 24 hours) at 8/10/2022 1044  Last data filed at 2022 1700  Gross per 24 hour   Intake 480 ml   Output --   Net 480 ml       Physical Exam  Eyes:      Pupils: Pupils are equal, round, and reactive to light.   Cardiovascular:      Rate and Rhythm: Normal rate and regular rhythm.      Heart sounds: No murmur heard.  Pulmonary:      Effort: Pulmonary effort is normal.      Comments: Mild coarse breath sounds  Abdominal:      General: Bowel sounds are normal.      Palpations: Abdomen is soft. There is no mass.      Tenderness: There is no abdominal tenderness.   Musculoskeletal:         General: No swelling.   Neurological:      Mental Status: She is alert.       Results Review:    I have reviewed the laboratory and imaging data since the last note by [facility] physician.  My annotations are noted in assessment and plan.    Medication Review:  I have reviewed the current MAR.  My annotations are noted in assessment and plan.    Pharmacy to dose vancomycin,       Plan   PCCM Problems  Bilateral pulmonary infiltrates  Positive HIV      THESE ARE NEW MEDICAL PROBLEMS TO ME.    Plan of  Treatment    Discussed with overnight intensivist.  Also discussed with hospitalist and with ID physician.  Possible EVALI or infectious etiology secondary to HIV diagnosis.  We will proceed with bronchoscopy with lavage and biopsy this afternoon.  Depending on results will determine further treatment.  Given recent positive Fungitell this may be PJP.  Will likely require empiric coverage until results available.    Ricardo Trevino MD  08/10/22  10:44 EDT    While in the room and during my examination of the patient I wore gloves, gown, mask, eye protection and followed enhanced droplet/contact isolation protocol and precautions.  I washed my hands before and after this patient encounter.    Part of this note may be an electronic transcription/translation of spoken language to printed text using the Dragon Dictation System.

## 2022-08-10 NOTE — ANESTHESIA POSTPROCEDURE EVALUATION
Patient: Uri Mirza    Procedure Summary     Date: 08/10/22 Room / Location:  [facility]    Anesthesia Start: 1530 Anesthesia Stop: 1607    Procedure: BRONCHOSCOPY WITH FLUORO, BIOPSY, AND BAL (N/A Bronchus) Diagnosis:       Pneumonia of both lungs due to infectious organism, unspecified part of lung      (Pneumonia of both lungs due to infectious organism, unspecified part of lung [J18.9])    Surgeons: Ricardo Trevino MD Provider: Richard Man MD    Anesthesia Type: general ASA Status: 3          Anesthesia Type: general    Vitals  Vitals Value Taken Time   /77 08/10/22 1615   Temp     Pulse 80 08/10/22 1615   Resp 17 08/10/22 1615   SpO2 93 % 08/10/22 1615           Post Anesthesia Care and Evaluation      Comments: Pt. Discharged prior to being evaluated by anesthesia.  Chart is reviewed and no complications are noted.  THIS CASE IS NOT MEDICALLY DIRECTED

## 2022-08-10 NOTE — PROGRESS NOTES
Name: Uri Mirza ADMIT: 2022   : 1990  PCP: Provider, No Known    MRN: [id number] LOS: 1 days   AGE/SEX: 32 y.o. female  ROOM: [facility]     Subjective   Subjective   Short of breath with exertion. Reports that she was taken off pred after last d/c and switched to itraconazole. Possibly by PCP? Itra made her very sick, nauseated and poor appetite.     Reviewed records. Fungitell came back positive but all antigens were negative.    Review of Systems     Objective   Objective   Vital Signs  Temp:  [97.5 °F (36.4 °C)-98.9 °F (37.2 °C)] 97.8 °F (36.6 °C)  Heart Rate:  [62-95] 62  Resp:  [18] 18  BP: (116-130)/(80-88) 129/88  SpO2:  [92 %-99 %] 98 %  on  Flow (L/min):  [3-5] 3;   Device (Oxygen Therapy): nasal cannula  Body mass index is 23.27 kg/m².  Physical Exam  Vitals and nursing note reviewed.   Constitutional:       General: She is not in acute distress.     Appearance: She is ill-appearing.   HENT:      Head: Normocephalic and atraumatic.      Mouth/Throat:      Pharynx: No posterior oropharyngeal erythema.   Eyes:      General: No scleral icterus.  Neck:      Vascular: No JVD.   Cardiovascular:      Rate and Rhythm: Normal rate and regular rhythm.      Pulses: Normal pulses.      Heart sounds: Normal heart sounds. No murmur heard.  Pulmonary:      Effort: Pulmonary effort is normal. No respiratory distress.      Breath sounds: Rhonchi and rales present.   Abdominal:      General: There is no distension.      Palpations: Abdomen is soft.      Tenderness: There is no abdominal tenderness.   Musculoskeletal:         General: No swelling or tenderness.      Cervical back: Neck supple.   Skin:     General: Skin is warm and dry.      Coloration: Skin is not jaundiced.      Findings: No rash.   Neurological:      Mental Status: She is alert and oriented to person, place, and time.   Psychiatric:         Mood and Affect: Mood normal.         Results Review     I reviewed the patient's new clinical  results.  Results from last 7 days   Lab Units 08/09/22  0735 08/08/22  1514   WBC 10*3/mm3 3.88 4.26   HEMOGLOBIN g/dL 10.7* 12.1   PLATELETS 10*3/mm3 265 278     Results from last 7 days   Lab Units 08/09/22  0735 08/08/22  1514   SODIUM mmol/L 135* 137   POTASSIUM mmol/L 4.5 3.5   CHLORIDE mmol/L 104 99   CO2 mmol/L 22.0 23.3   BUN mg/dL 12 12   CREATININE mg/dL 0.62 0.72   GLUCOSE mg/dL 166* 100*   EGFR mL/min/1.73 121.5 114.1     Results from last 7 days   Lab Units 08/08/22  1514   ALBUMIN g/dL 3.50   BILIRUBIN mg/dL 0.2   ALK PHOS U/L 54   AST (SGOT) U/L 33*   ALT (SGPT) U/L 17     Results from last 7 days   Lab Units 08/09/22  0735 08/08/22  1514   CALCIUM mg/dL 8.8 8.9   ALBUMIN g/dL  --  3.50     Results from last 7 days   Lab Units 08/08/22  1514   PROCALCITONIN ng/mL 0.08   LACTATE mmol/L 1.5     No results found for: HGBA1C, POCGLU    XR Chest 1 View    Result Date: 8/8/2022  Symmetric opacity in the mid and lower lung zones bilaterally favored represent bilateral pulmonary edema.  This report was finalized on 8/8/2022 3:25 PM by Dr. Marvin Gautam M.D.      CT Angiogram Chest    Result Date: 8/8/2022  1. There is no evidence for a pulmonary embolism. 2. There has been mild interval improvement in multilobar bilateral pneumonia, particularly within the bilateral upper lobes.  These findings were communicated to Sanket Youngblood MD, in the emergency room on 08/08/2022 at 4:50 PM.  Radiation dose reduction techniques were utilized, including automated exposure control and exposure modulation based on body size.  This report was finalized on 8/8/2022 9:13 PM by Dr. Billy Manuel M.D.      Scheduled Medications  cefepime, 2 g, Intravenous, Q8H  methylPREDNISolone sodium succinate, 40 mg, Intravenous, Q8H  vancomycin, 1,000 mg, Intravenous, Q12H    Infusions  Pharmacy to dose vancomycin,     Diet  Diet Regular; Cardiac       Assessment/Plan     Active Hospital Problems    Diagnosis  POA   • **Pneumonia of  both lungs due to infectious organism, unspecified part of lung [J18.9]  Yes   • Acute respiratory failure with hypoxia (HCC) [J96.01]  Yes   • Anemia [D64.9]  Unknown      Resolved Hospital Problems   No resolved problems to display.       32 y.o. female admitted with hypoxic respiratory failure and pneumonia.    Interesting case, unlikely bacterial pneumonia. Will consider d/c abx if no growth on any cx by tomorrow. ?Inflammatory PNA  - Viral PCR negative.   - Positive Fungitell, negative Aspergillus last admit. Poor tolerance/response to itraconazole.  - Need Pulm to eval again. Consulted yesterday, nurse to recall  - Cont IV steroids  - O2 to keep sat >90%    Anemia- check some workup. No bleeding noted      · Add Lovenox for DVT prophy  · Dispo TBD      Artemio Mednoza MD  [facility]  08/09/22  21:39 EDT

## 2022-08-10 NOTE — PROGRESS NOTES
Name: Uri Mirza ADMIT: 2022   : 1990  PCP: Provider, No Known    MRN: [id number] LOS: 2 days   AGE/SEX: 32 y.o. female  ROOM: [facility]     Subjective   Subjective   Remains short of breath with exertion.  Not coughing as much    Review of Systems     Objective   Objective   Vital Signs  Temp:  [97.3 °F (36.3 °C)-97.8 °F (36.6 °C)] 97.3 °F (36.3 °C)  Heart Rate:  [50-76] 52  Resp:  [18-22] 22  BP: (129-136)/(88-97) 129/92  SpO2:  [98 %] 98 %  on  Flow (L/min):  [3] 3;   Device (Oxygen Therapy): nasal cannula  Body mass index is 23.04 kg/m².  Physical Exam  Vitals and nursing note reviewed.   Constitutional:       General: She is not in acute distress.     Appearance: She is ill-appearing.   HENT:      Head: Normocephalic and atraumatic.   Eyes:      General: No scleral icterus.  Neck:      Vascular: No JVD.   Cardiovascular:      Rate and Rhythm: Normal rate and regular rhythm.      Pulses: Normal pulses.      Heart sounds: Normal heart sounds. No murmur heard.  Pulmonary:      Effort: Pulmonary effort is normal. No respiratory distress.      Breath sounds: Rhonchi and rales present.   Abdominal:      General: There is no distension.      Palpations: Abdomen is soft.      Tenderness: There is no abdominal tenderness.   Musculoskeletal:         General: No swelling or tenderness.      Cervical back: Neck supple.   Skin:     General: Skin is warm and dry.      Coloration: Skin is not jaundiced.      Findings: No rash.   Neurological:      Mental Status: She is alert and oriented to person, place, and time.   Psychiatric:         Mood and Affect: Mood normal.         Results Review     I reviewed the patient's new clinical results.  Results from last 7 days   Lab Units 08/10/22  0535 22  0735 22  1514   WBC 10*3/mm3 8.72 3.88 4.26   HEMOGLOBIN g/dL 10.2* 10.7* 12.1   PLATELETS 10*3/mm3 262 265 278     Results from last 7 days   Lab Units 08/10/22  0535 22  0735 22  1514   SODIUM  mmol/L 138 135* 137   POTASSIUM mmol/L 4.6 4.5 3.5   CHLORIDE mmol/L 107 104 99   CO2 mmol/L 23.0 22.0 23.3   BUN mg/dL 14 12 12   CREATININE mg/dL 0.66 0.62 0.72   GLUCOSE mg/dL 157* 166* 100*   EGFR mL/min/1.73 119.7 121.5 114.1     Results from last 7 days   Lab Units 08/08/22  1514   ALBUMIN g/dL 3.50   BILIRUBIN mg/dL 0.2   ALK PHOS U/L 54   AST (SGOT) U/L 33*   ALT (SGPT) U/L 17     Results from last 7 days   Lab Units 08/10/22  0535 08/09/22  0735 08/08/22  1514   CALCIUM mg/dL 8.9 8.8 8.9   ALBUMIN g/dL  --   --  3.50     Results from last 7 days   Lab Units 08/08/22  1514   PROCALCITONIN ng/mL 0.08   LACTATE mmol/L 1.5     No results found for: HGBA1C, POCGLU    XR Chest 1 View    Result Date: 8/8/2022  Symmetric opacity in the mid and lower lung zones bilaterally favored represent bilateral pulmonary edema.  This report was finalized on 8/8/2022 3:25 PM by Dr. Marvin Gautam M.D.      CT Angiogram Chest    Result Date: 8/8/2022  1. There is no evidence for a pulmonary embolism. 2. There has been mild interval improvement in multilobar bilateral pneumonia, particularly within the bilateral upper lobes.  These findings were communicated to Sanket Youngblood MD, in the emergency room on 08/08/2022 at 4:50 PM.  Radiation dose reduction techniques were utilized, including automated exposure control and exposure modulation based on body size.  This report was finalized on 8/8/2022 9:13 PM by Dr. Billy Manuel M.D.      Scheduled Medications  enoxaparin, 40 mg, Subcutaneous, Nightly  methylPREDNISolone sodium succinate, 40 mg, Intravenous, Q8H  sulfamethoxazole-trimethoprim, 2 tablet, Oral, Q8H    Infusions   Diet  NPO Diet NPO Type: Sips with Meds       Assessment/Plan     Active Hospital Problems    Diagnosis  POA   • **Pneumonia of both lungs due to infectious organism, unspecified part of lung [J18.9]  Yes   • Acute respiratory failure with hypoxia (HCC) [J96.01]  Yes   • Anemia [D64.9]  Unknown      Resolved  Hospital Problems   No resolved problems to display.       32 y.o. female previously hospitalized in July with atypical pneumonia, now admitted again with hypoxic respiratory failure and pneumonia    Ordered HIV, which has come back positive.  I informed patient of this result and she was very upset, stating that she had had been tested about 3 months ago prior to getting  with her  and both were negative.  Multiple prior negative tests as well.  She is in a monogamous relationship currently.  No history of blood transfusion or IV drug use.  She felt she had been tested at this facility before but no results in the computer indicate that this is the case.  - Consulted ID.  Discussed with Dr. Reddy and I agree with coverage for PCP. Checking CD4 conts, viral load, etc  - MRSA swab positive.  Defer to ID whether to continue vancomycin but almost certainly can stop cefepime  - Discussed with Dr. Trevino.  Planning bronchoscopy today which should help with diagnosis  - Wean O2 if tolerated  - Continue IV steroids    [id number] indicative of anemia of chronic disease.  No bleeding noted      · Lovenox for DVT prophy  · Dispo TBD.  Home when work-up complete      Artemio Mendoza MD  [facility]  08/10/22  13:31 EDT

## 2022-08-10 NOTE — CONSULTS
Referring Provider:   Subjective   History of present illness: 32-year-old with multiple issues.  First patient has had about a month of shortness of breath, better with exercise, worse with exertion and dry cough occasionally followed by some posttussive emesis.  Patient was hospitalized at [facility] in July and had positive Fungitell over 500 and C ANCA positive at 1: 160.  Started on itraconazole as outpatient but no relief in symptoms.  Had also been treated with steroids.  Readmitted on 8/8/2022 and this time tested positive for HIV.  Working diagnosis had been fungal pneumonia versus inflammatory pneumonitis from vaping.  Autoimmune possibilities were also entertained given C ANCA positive.  MPO and anti-VT-3 antibodies negative.  In any event, patient reports 50 sexual partners.  Denies IV drug use.  Says was born at [facility] and had been on estrogen therapy.  Denies any surgical history.  Patient says was tested for HIV in August but shows me a RPR test which I said would only test for syphilis.  Recently  in May and reportedly again tested negative for HIV at that time along with spouse although we do not have those records      Discussed with Dr. Trevino.  Discussed with Dr. Mendoza along with nursing.  Prior records reviewed    Past medical history: Pneumonia  No family history of infectious diseases  Social history:  lives here in [place], [place].  Works for [employer].    Allergies   Allergen Reactions   • Banana Itching   • Mushroom Provider Review Needed     Review of Systems  Pertinent items are noted in HPI, all other systems reviewed and negative    Objective     Physical Exam:   Vital Signs   Temp:  [97.4 °F (36.3 °C)-97.8 °F (36.6 °C)] 97.4 °F (36.3 °C)  Heart Rate:  [50-76] 50  Resp:  [18-22] 22  BP: (123-136)/(88-97) 136/97    GENERAL: Awake and alert, in no acute distress.   HEENT: Oropharynx is clear. Hearing is grossly normal.   EYES: PERRL. No  "conjunctival injection. No lid lag.   LYMPHATICS: No lymphadenopathy of the neck or inguinal regions.   HEART: Regular rate and regular rhythm. No peripheral edema.   LUNGS: Rales with normal respiratory effort.   GI: Soft, nontender, nondistended. No appreciable organomegaly.   SKIN: Warm and dry without cutaneous eruptions   PSYCHIATRIC: Appropriate mood, affect, insight, and judgment.     Results Review:       Lab Results   Component Value Date    WBC 8.72 08/10/2022    HGB 10.2 (L) 08/10/2022    HCT 33.1 (L) 08/10/2022    MCV 81.5 08/10/2022     08/10/2022         Creatinine 0.66  Microbiology:  Radiology: CT chest, reviewed, diffuse bilateral patchy infiltrates    A/p  1.  Acute hypoxic respiratory failure  2.  HIV  3.  Pneumocystis pneumonia    Procal negative  HIV positive, suspect advanced AIDS with pneumocystitis given infiltrates and very positive fungitell  Stop vanc and cefepime  Start bactrim 2 double strength tablets daily x21 days.  That gives roughly 15-20 mg/kg daily dose of trimethoprim. Already on steroids as per pulmonary  HIV RNA, CD4, rpr, urine gc, hiv genotype, hepatitis studies with hepatitis C antibodies and hep A&B profile  Check histo ag as that would be other may infectious possibility.  Checking crypto Ag, blasto Ag (although these unlikely to cause fungitell that high)    Discussed with patient HIV diagnosis, goals of treatment, lab monitoring, means to reduce transmission, need for adherence to therapy as well as treatment plan, and ways to militate against the progression of disease.  Patient understands and all questions answered.    Despite patient's admonitions, I could not find prior HIV testing.  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    Thank you for this consult.  We will continue to follow along and tailor antibiotics as the patient's clinical course evolves.      Dhruv Reddy MD  08/10/22  11:39 " EDT

## 2022-08-10 NOTE — ANESTHESIA PREPROCEDURE EVALUATION
Anesthesia Evaluation     Patient summary reviewed and Nursing notes reviewed   NPO Solid Status: > 8 hours  NPO Liquid Status: > 2 hours           Airway   Mallampati: I  TM distance: >3 FB  Neck ROM: full  No difficulty expected  Dental - normal exam     Pulmonary - normal exam   (+) pneumonia stable , shortness of breath,   Cardiovascular - normal exam  Exercise tolerance: poor (<4 METS)        Neuro/Psych- negative ROS  GI/Hepatic/Renal/Endo - negative ROS     Musculoskeletal (-) negative ROS    Abdominal  - normal exam    Bowel sounds: normal.   Substance History - negative use     OB/GYN negative ob/gyn ROS         Other        ROS/Med Hx Other: Patient was admitted with acute respiratory failure, pneumonia and recently detected HIV positive.                  Anesthesia Plan    ASA 3     general     intravenous induction     Anesthetic plan, risks, benefits, and alternatives have been provided, discussed and informed consent has been obtained with: patient.        CODE STATUS:    Code Status (Patient has no pulse and is not breathing): CPR (Attempt to Resuscitate)  Medical Interventions (Patient has pulse or is breathing): Full

## 2022-08-11 LAB
CENTROMERE B AB SER-ACNC: <0.2 AI (ref 0–0.9)
CHROMATIN AB SERPL-ACNC: <0.2 AI (ref 0–0.9)
DSDNA AB SER-ACNC: <1 IU/ML (ref 0–9)
ENA JO1 AB SER-ACNC: <0.2 AI (ref 0–0.9)
ENA RNP AB SER-ACNC: 0.4 AI (ref 0–0.9)
ENA SCL70 AB SER-ACNC: <0.2 AI (ref 0–0.9)
ENA SM AB SER-ACNC: <0.2 AI (ref 0–0.9)
ENA SM+RNP AB SER-ACNC: <0.2 AI (ref 0–0.9)
ENA SS-A AB SER-ACNC: <0.2 AI (ref 0–0.9)
ENA SS-B AB SER-ACNC: <0.2 AI (ref 0–0.9)
HIV 1 & 2 AB SERPLBLD IA.RAPID: ABNORMAL
HIV 2 AB SERPLBLD QL IA.RAPID: NON REACTIVE
HIV1 AB SERPLBLD QL IA.RAPID: REACTIVE
Lab: NORMAL
RIBOSOMAL P AB SER-ACNC: <0.2 AI (ref 0–0.9)

## 2022-08-11 PROCEDURE — 87340 HEPATITIS B SURFACE AG IA: CPT | Performed by: INTERNAL MEDICINE

## 2022-08-11 PROCEDURE — 86708 HEPATITIS A ANTIBODY: CPT | Performed by: INTERNAL MEDICINE

## 2022-08-11 PROCEDURE — 99233 SBSQ HOSP IP/OBS HIGH 50: CPT | Performed by: INTERNAL MEDICINE

## 2022-08-11 PROCEDURE — 25010000002 METHYLPREDNISOLONE PER 40 MG: Performed by: INTERNAL MEDICINE

## 2022-08-11 PROCEDURE — 86709 HEPATITIS A IGM ANTIBODY: CPT | Performed by: INTERNAL MEDICINE

## 2022-08-11 PROCEDURE — 86704 HEP B CORE ANTIBODY TOTAL: CPT | Performed by: INTERNAL MEDICINE

## 2022-08-11 PROCEDURE — 86706 HEP B SURFACE ANTIBODY: CPT | Performed by: INTERNAL MEDICINE

## 2022-08-11 PROCEDURE — 86361 T CELL ABSOLUTE COUNT: CPT | Performed by: INTERNAL MEDICINE

## 2022-08-11 PROCEDURE — 86705 HEP B CORE ANTIBODY IGM: CPT | Performed by: INTERNAL MEDICINE

## 2022-08-11 PROCEDURE — 25010000002 ENOXAPARIN PER 10 MG: Performed by: HOSPITALIST

## 2022-08-11 RX ADMIN — SULFAMETHOXAZOLE AND TRIMETHOPRIM 2 TABLET: 800; 160 TABLET ORAL at 20:28

## 2022-08-11 RX ADMIN — METHYLPREDNISOLONE SODIUM SUCCINATE 40 MG: 40 INJECTION, POWDER, FOR SOLUTION INTRAMUSCULAR; INTRAVENOUS at 14:53

## 2022-08-11 RX ADMIN — SULFAMETHOXAZOLE AND TRIMETHOPRIM 2 TABLET: 800; 160 TABLET ORAL at 14:52

## 2022-08-11 RX ADMIN — SULFAMETHOXAZOLE AND TRIMETHOPRIM 2 TABLET: 800; 160 TABLET ORAL at 04:33

## 2022-08-11 RX ADMIN — Medication 10 ML: at 20:29

## 2022-08-11 RX ADMIN — METHYLPREDNISOLONE SODIUM SUCCINATE 40 MG: 40 INJECTION, POWDER, FOR SOLUTION INTRAMUSCULAR; INTRAVENOUS at 20:28

## 2022-08-11 RX ADMIN — METHYLPREDNISOLONE SODIUM SUCCINATE 40 MG: 40 INJECTION, POWDER, FOR SOLUTION INTRAMUSCULAR; INTRAVENOUS at 04:33

## 2022-08-11 NOTE — PROGRESS NOTES
Name: Uri Mirza ADMIT: 2022   : 1990  PCP: Provider, No Known    MRN: [id number] LOS: 3 days   AGE/SEX: 32 y.o. female  ROOM: [facility]     Subjective   Subjective   Feeling a little better. Seems depressed, not talkative today    Review of Systems     Objective   Objective   Vital Signs  Temp:  [97.5 °F (36.4 °C)-98.2 °F (36.8 °C)] 98.2 °F (36.8 °C)  Heart Rate:  [61-84] 80  Resp:  [16-20] 18  BP: (122-132)/(77-93) 130/89  SpO2:  [92 %-99 %] 92 %  on  Flow (L/min):  [2-4] 3;   Device (Oxygen Therapy): nasal cannula  Body mass index is 23.12 kg/m².  Physical Exam  Vitals and nursing note reviewed.   Constitutional:       General: She is not in acute distress.     Appearance: She is not ill-appearing.   HENT:      Head: Normocephalic and atraumatic.   Eyes:      General: No scleral icterus.  Neck:      Vascular: No JVD.   Cardiovascular:      Rate and Rhythm: Normal rate and regular rhythm.      Pulses: Normal pulses.      Heart sounds: Normal heart sounds. No murmur heard.  Pulmonary:      Effort: Pulmonary effort is normal. No respiratory distress.      Breath sounds: Rhonchi and rales present.   Abdominal:      General: There is no distension.      Palpations: Abdomen is soft.      Tenderness: There is no abdominal tenderness.   Musculoskeletal:         General: No swelling or tenderness.      Cervical back: Neck supple.   Skin:     General: Skin is warm and dry.      Coloration: Skin is not jaundiced.      Findings: No rash.   Neurological:      Mental Status: She is alert and oriented to person, place, and time.   Psychiatric:      Comments: Very flat affect today         Results Review     I reviewed the patient's new clinical results.  Results from last 7 days   Lab Units 08/10/22  0535 22  0735 22  1514   WBC 10*3/mm3 8.72 3.88 4.26   HEMOGLOBIN g/dL 10.2* 10.7* 12.1   PLATELETS 10*3/mm3 262 265 278     Results from last 7 days   Lab Units 08/10/22  0535 22  0735  08/08/22  1514   SODIUM mmol/L 138 135* 137   POTASSIUM mmol/L 4.6 4.5 3.5   CHLORIDE mmol/L 107 104 99   CO2 mmol/L 23.0 22.0 23.3   BUN mg/dL 14 12 12   CREATININE mg/dL 0.66 0.62 0.72   GLUCOSE mg/dL 157* 166* 100*   EGFR mL/min/1.73 119.7 121.5 114.1     Results from last 7 days   Lab Units 08/08/22  1514   ALBUMIN g/dL 3.50   BILIRUBIN mg/dL 0.2   ALK PHOS U/L 54   AST (SGOT) U/L 33*   ALT (SGPT) U/L 17     Results from last 7 days   Lab Units 08/10/22  0535 08/09/22  0735 08/08/22  1514   CALCIUM mg/dL 8.9 8.8 8.9   ALBUMIN g/dL  --   --  3.50     Results from last 7 days   Lab Units 08/08/22  1514   PROCALCITONIN ng/mL 0.08   LACTATE mmol/L 1.5     No results found for: HGBA1C, POCGLU    XR Chest 1 View    Result Date: 8/10/2022   As described.  This report was finalized on 8/10/2022 4:23 PM by Dr. Terrell Su M.D.      FL C Arm During Surgery    Result Date: 8/10/2022   As described.  This report was finalized on 8/10/2022 4:23 PM by Dr. Terrell Su M.D.      Scheduled Medications  enoxaparin, 40 mg, Subcutaneous, Nightly  methylPREDNISolone sodium succinate, 40 mg, Intravenous, Q8H  sulfamethoxazole-trimethoprim, 2 tablet, Oral, Q8H    Infusions  sodium chloride, 30 mL/hr    Diet  Diet Regular; Cardiac       Assessment/Plan     Active Hospital Problems    Diagnosis  POA   • **Pneumonia of both lungs due to infectious organism, unspecified part of lung [J18.9]  Yes   • Acute respiratory failure with hypoxia (HCC) [J96.01]  Yes   • Anemia [D64.9]  Unknown      Resolved Hospital Problems   No resolved problems to display.       32 y.o. female previously hospitalized in July with atypical pneumonia, now admitted again with hypoxic respiratory failure and pneumonia. Found to have HIV positive status, new diagnosis, presumed PCP infection, s/p bronchoscopy 8/10    Continue Bactrim and IV steroids. Could consider transition to po prednisone and dc soon if continues to progress.   - f/u pending  cultures and antigens  - wean o2 if tolerated    Await HIV viral load and CD4 counts. Will need outpatient ID follow up obviously    Emefpr-[id number] indicative of anemia of chronic disease.  No bleeding noted    Will ask Access to see for counseling. Seems depressed about her situation.    · Lovenox for DVT prophy  · Dispo TBD.  Home 1-2 days likely      Artemio Mendoza MD  [facility]  08/11/22  13:52 EDT

## 2022-08-11 NOTE — PLAN OF CARE
Problem: Adult Inpatient Plan of Care  Goal: Plan of Care Review  Outcome: Ongoing, Progressing  Flowsheets (Taken 8/11/2022 0553)  Progress: no change  Plan of Care Reviewed With: patient  Outcome Evaluation:   VSS   2L NC O2   SR on monitor   up ad levon   no c/o pain, soa, or N/V   A&Ox4   no acute distress noted   will cont to monitor   Goal Outcome Evaluation:  Plan of Care Reviewed With: patient        Progress: no change  Outcome Evaluation: VSS; 2L NC O2; SR on monitor; up ad levon; no c/o pain, soa, or N/V; A&Ox4; no acute distress noted; will cont to monitor

## 2022-08-11 NOTE — CASE MANAGEMENT/SOCIAL WORK
Discharge Planning Assessment  [facility]     Patient Name: Uri Mirza  MRN: [id number]  Today's Date: 8/11/2022    Admit Date: 8/8/2022     Discharge Needs Assessment     Row Name 08/11/22 1609       Living Environment    People in Home spouse    Current Living Arrangements home    Primary Care Provided by self    Family Caregiver if Needed spouse    Family Caregiver Names Deseantyaniv    Quality of Family Relationships helpful;involved    Able to Return to Prior Arrangements yes       Resource/Environmental Concerns    Resource/Environmental Concerns none       Transition Planning    Patient/Family Anticipates Transition to home with family    Patient/Family Anticipated Services at Transition none    Transportation Anticipated car, drives self;family or friend will provide       Discharge Needs Assessment    Equipment Currently Used at Home none    Concerns to be Addressed adjustment to diagnosis/illness    Anticipated Changes Related to Illness none    Equipment Needed After Discharge oxygen               Discharge Plan     Row Name 08/11/22 1610       Plan    Plan Home with family    Patient/Family in Agreement with Plan yes    Plan Comments Spoke with pt., introduced self and explained CCP role. Confirmed face sheet and pharmacy information. Pt lives with her spouse Yumiko with no environmental concerns. No HH or SNF hx., she uses not medical equipment but may need oxygen at d/c. Pt states she had oxygen once through [employer] but they only provided a transport tank. Pt anticipates no d/c needs at this time, plans home, she will drive herself car is here. Referral sent to [facility] with pt permission, spoke with Nerissa/Toshia who states she can deliver transport tank in am.  CCP will follow. - Nannette CRAIG              Continued Care and Services - Admitted Since 8/8/2022     Durable Medical Equipment     Service Provider Request Status Selected Services Address Phone Fax Patient Preferred    [facility]    Selected Durable Medical Equipment [address], [place] [facility] [address] [phone] --       Internal Comment last updated by Nannette Calle, RN 8/11/2022 1443    oxygen                       Selected Continued Care - Prior Encounters Includes selections from prior encounters from 5/10/2022 to 8/11/2022    Discharged on 7/11/2022 Admission date: 7/8/2022 - Discharge disposition: Home or Self Care    Durable Medical Equipment     Service Provider Selected Services Address Phone Fax Patient Preferred    [facility]  Durable Medical Equipment [address], [place] [address] [phone] --       Internal Comment last updated by Hilda Mast RN 7/11/2022 1006    Patient may need home oxygen.                                Expected Discharge Date and Time     Expected Discharge Date Expected Discharge Time    Aug 12, 2022          Demographic Summary     Row Name 08/11/22 1608       General Information    Admission Type inpatient               Functional Status     Row Name 08/11/22 1609       Functional Status    Usual Activity Tolerance excellent    Current Activity Tolerance excellent       Assessment of Health Literacy    Health Literacy Excellent       Functional Status, IADL    Medications independent    Meal Preparation independent    Housekeeping independent    Laundry independent    Shopping independent       Mental Status    General Appearance WDL WDL       Mental Status Summary    Recent Changes in Mental Status/Cognitive Functioning no changes               Psychosocial    No documentation.                Abuse/Neglect    No documentation.                Legal     Row Name 08/11/22 1609       Financial/Legal    Who Manages Finances if Patient Unable Spouse Ahtune               Substance Abuse    No documentation.                Patient Forms    No documentation.                   Nannette Calle, RN

## 2022-08-11 NOTE — PROGRESS NOTES
[facility]  [phone]  Dr. Ricardo Trevino    Subjective:  LOS: 3    Chief Complaint: Dyspnea    On low-flow oxygen.  Denies any new complaints.  Mostly busy playing with her phone.    Objective   Vital Signs past 24hrs    Temp range: Temp (24hrs), Av.9 °F (36.6 °C), Min:97.5 °F (36.4 °C), Max:98.2 °F (36.8 °C)    BP range: BP: (122-132)/(77-93) 130/89  Pulse range: Heart Rate:  [71-84] 80  Resp rate range: Resp:  [17-20] 18    Device (Oxygen Therapy): nasal cannulaFlow (L/min):  [2-4] 3  Oxygen range:SpO2:  [92 %-99 %] 92 %      61.1 kg (134 lb 11.2 oz); Body mass index is 23.12 kg/m².    Intake/Output Summary (Last 24 hours) at 2022 1431  Last data filed at 8/10/2022 1700  Gross per 24 hour   Intake 240 ml   Output --   Net 240 ml       Physical Exam  Eyes:      Pupils: Pupils are equal, round, and reactive to light.   Cardiovascular:      Rate and Rhythm: Normal rate and regular rhythm.      Heart sounds: No murmur heard.  Pulmonary:      Effort: Pulmonary effort is normal.      Comments: Mild coarse breath sounds  Abdominal:      General: Bowel sounds are normal.      Palpations: Abdomen is soft. There is no mass.      Tenderness: There is no abdominal tenderness.   Musculoskeletal:         General: No swelling.   Neurological:      Mental Status: She is alert.       Results Review:    I have reviewed the laboratory and imaging data since the last note by [facility] physician.  My annotations are noted in assessment and plan.    Medication Review:  I have reviewed the current MAR.  My annotations are noted in assessment and plan.    sodium chloride, 30 mL/hr      Plan   PCCM Problems  Bilateral pulmonary infiltrates  Positive HIV  Recent use of vaping products      Plan of Treatment    Pending results of bronchoscopy.  On empiric therapy for PJP per ID.    Also on steroids 40 IV every 8 hours.  Cannot exclude eVALI.  However if infectious etiology is confirmed then can likely discontinue  high-dose steroids and simply continue for appropriate infectious process.    Ricardo Trevino MD  08/11/22  14:31 EDT    While in the room and during my examination of the patient I wore gloves, gown, mask, eye protection and followed enhanced droplet/contact isolation protocol and precautions.  I washed my hands before and after this patient encounter.    Part of this note may be an electronic transcription/translation of spoken language to printed text using the Dragon Dictation System.

## 2022-08-11 NOTE — PROGRESS NOTES
ID note for HIV  Subjective: Denies any pain, dyspnea, fever, chills, night sweats, difficulty with antibiotics.  On 2 L  Objective  Temp:  [97.3 °F (36.3 °C)-98.1 °F (36.7 °C)] 98.1 °F (36.7 °C)  Heart Rate:  [52-84] 78  Resp:  [16-22] 18  BP: (122-132)/(77-93) 122/91  GENERAL: Awake and alert, in no acute distress.   HEENT:  Hearing is grossly normal.   EYES:  No conjunctival injection. No lid lag.   HEART: Regular rate and rhythm. No peripheral edema.   LUNGS: Rales with normal respiratory effort.   GI: Soft, nontender, nondistended. No appreciable organomegaly.   SKIN: Warm and dry without cutaneous eruptions   PSYCHIATRIC: Appropriate mood, flat affect, fair insight, and judgment.     HIV RNA (GT=??? )  --    CD4  --    Hepatitis  --HAV   --HBV   --HCV nonreactive 8/8/2022    STI  --RPR nonreactive 8/2021; 8/2022  --Urine GC    Vaccines  --HAV  --HBV  --PPSV 23  --PCV13  --Tdap    Misc  --Tspot  --[id number]    July 2022, Fungitell greater than 500, c-ANCA 1: 160, negative p-ANCA, MPO and OH-3  August 2022 cryptococcal antigen negative, histoplasmosis and blastomycosis pending  August 10, 2022 bronchoscopy studies  -- Red blood cells 269, white blood cells 140, 12% neutrophils  -- Fungal culture pending  -- AFB culture pending  -- Bacterial culture no growth to date  -- Pathology pending    TTE  · Left ventricular ejection fraction appears to be 56 - 60%. Left ventricular systolic function is normal.  · Left ventricular diastolic function was normal.  · Normal right ventricular cavity size and systolic function noted.  · Trace to mild tricuspid valve regurgitation is present.  · Calculated right ventricular systolic pressure from tricuspid regurgitation is 23 mmHg.  · There is a trivial circumferential pericardial effusion. There is no evidence of cardiac tamponade.        Assessment and plan  1.  Acute hypoxic respiratory failure  2.  HIV  3.  Pneumocystis pneumonia  4.  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  Tolerating nicely so far.  BMP and CBC in a.m. to make sure stable on antibiotics  Multiple HIV labs pending including HIV viral load, CD4 count and genotype.  Likely start Biktarvy for ART once viral load comes back

## 2022-08-11 NOTE — DISCHARGE PLACEMENT REQUEST
"Uri Mirza (32 y.o. Female)             Date of Birth   1990    Social Security Number       Address   [address] [facility] [address]    Home Phone   [phone]    MRN   [id number]       Hinduism   None    Marital Status   Single                            Admission Date   8/8/22    Admission Type   Emergency    Admitting Provider   Luz Maria Gagnon MD    Attending Provider   Artemio Mendoza MD    Department, Room/Bed   [address], [address]       Discharge Date       Discharge Disposition       Discharge Destination                               Attending Provider: Artemio Mendoza MD    Allergies: Banana, Mushroom    Isolation: Contact   Infection: MRSA (08/10/22)   Code Status: CPR   Advance Care Planning Activity    Ht: 162.6 cm (64\")   Wt: 61.1 kg (134 lb 11.2 oz)    Admission Cmt: None   Principal Problem: Pneumonia of both lungs due to infectious organism, unspecified part of lung [J18.9]                 Active Insurance as of 8/8/2022     Primary Coverage     Payor Plan Insurance Group Employer/Plan Group    MISC LBP MISC [id number]     Coverage Address Coverage Phone Number Coverage Fax Number Effective Dates    [address] [phone]  6/27/2022 - None Entered    [facility] [id number]       Subscriber Name Subscriber Birth Date Member ID       URI MIRZA 1990 [id number]                 Emergency Contacts      (Rel.) Home Phone Work Phone Mobile Phone    Yumiko Price (Spouse) [phone] -- [phone]    SINANNOY (Brother) [phone] -- --              "

## 2022-08-11 NOTE — PLAN OF CARE
Goal Outcome Evaluation:  Plan of Care Reviewed With: patient      Pt remains stable at this time on room air. Doing well this shift

## 2022-08-12 ENCOUNTER — APPOINTMENT (OUTPATIENT)
Dept: GENERAL RADIOLOGY | Facility: HOSPITAL | Age: 32
End: 2022-08-12

## 2022-08-12 PROBLEM — B59 PNEUMOCYSTIS JIROVECI PNEUMONIA (HCC): Status: ACTIVE | Noted: 2022-08-12

## 2022-08-12 PROBLEM — B20 HIV (HUMAN IMMUNODEFICIENCY VIRUS INFECTION): Status: ACTIVE | Noted: 2022-08-12

## 2022-08-12 LAB
BACTERIA SPEC AEROBE CULT: NO GROWTH
BASOPHILS # BLD AUTO: 0 X10E3/UL (ref 0–0.2)
BASOPHILS NFR BLD AUTO: 0 %
C TRACH RRNA SPEC QL NAA+PROBE: NEGATIVE
C-ANCA TITR SER IF: ABNORMAL TITER
CCP IGA+IGG SERPL IA-ACNC: 4 UNITS (ref 0–19)
CD3+CD4+ CELLS # BLD: 14 /UL (ref 359–1519)
CD3+CD4+ CELLS NFR BLD: 1.6 % (ref 30.8–58.5)
ENA RNP AB SER-ACNC: 0.4 AI (ref 0–0.9)
ENA SCL70 AB SER-ACNC: <0.2 AI (ref 0–0.9)
ENA SM AB SER-ACNC: <0.2 AI (ref 0–0.9)
ENA SS-A AB SER-ACNC: <0.2 AI (ref 0–0.9)
ENA SS-B AB SER-ACNC: <0.2 AI (ref 0–0.9)
EOSINOPHIL # BLD AUTO: 0 X10E3/UL (ref 0–0.4)
EOSINOPHIL NFR BLD AUTO: 0 %
ERYTHROCYTE [DISTWIDTH] IN BLOOD BY AUTOMATED COUNT: 13.8 % (ref 11.7–15.4)
GRAM STN SPEC: NORMAL
HAV AB SER QL IA: NEGATIVE
HAV IGM SERPL QL IA: NEGATIVE
HBV CORE AB SERPL QL IA: NEGATIVE
HBV CORE IGM SERPL QL IA: NEGATIVE
HBV SURFACE AB SER QL: REACTIVE
HBV SURFACE AG SERPL QL IA: NEGATIVE
HCT VFR BLD AUTO: 33.9 % (ref 34–46.6)
HGB BLD-MCNC: 10.9 G/DL (ref 11.1–15.9)
IMM GRANULOCYTES # BLD AUTO: 0.2 X10E3/UL (ref 0–0.1)
IMM GRANULOCYTES NFR BLD AUTO: 1 %
LAB AP CASE REPORT: NORMAL
LYMPHOCYTES # BLD AUTO: 0.9 X10E3/UL (ref 0.7–3.1)
LYMPHOCYTES NFR BLD AUTO: 5 %
MCH RBC QN AUTO: 25.9 PG (ref 26.6–33)
MCHC RBC AUTO-ENTMCNC: 32.2 G/DL (ref 31.5–35.7)
MCV RBC AUTO: 81 FL (ref 79–97)
MONOCYTES # BLD AUTO: 0.6 X10E3/UL (ref 0.1–0.9)
MONOCYTES NFR BLD AUTO: 4 %
MYELOPEROXIDASE AB SER IA-ACNC: 0.4 UNITS (ref 0–0.9)
N GONORRHOEA RRNA SPEC QL NAA+PROBE: NEGATIVE
NEUTROPHILS # BLD AUTO: 15.3 X10E3/UL (ref 1.4–7)
NEUTROPHILS NFR BLD AUTO: 90 %
P-ANCA ATYPICAL TITR SER IF: ABNORMAL TITER
P-ANCA TITR SER IF: ABNORMAL TITER
PATH REPORT.FINAL DX SPEC: NORMAL
PATH REPORT.GROSS SPEC: NORMAL
PLATELET # BLD AUTO: 303 X10E3/UL (ref 150–450)
PROTEINASE3 AB SER IA-ACNC: 0.6 UNITS (ref 0–0.9)
RBC # BLD AUTO: 4.21 X10E6/UL (ref 3.77–5.28)
WBC # BLD AUTO: 17.1 X10E3/UL (ref 3.4–10.8)

## 2022-08-12 PROCEDURE — 71046 X-RAY EXAM CHEST 2 VIEWS: CPT

## 2022-08-12 PROCEDURE — 25010000002 METHYLPREDNISOLONE PER 40 MG: Performed by: INTERNAL MEDICINE

## 2022-08-12 PROCEDURE — 99232 SBSQ HOSP IP/OBS MODERATE 35: CPT | Performed by: INTERNAL MEDICINE

## 2022-08-12 PROCEDURE — 90791 PSYCH DIAGNOSTIC EVALUATION: CPT

## 2022-08-12 RX ORDER — PREDNISONE 20 MG/1
20 TABLET ORAL
Status: DISCONTINUED | OUTPATIENT
Start: 2022-08-19 | End: 2022-08-13 | Stop reason: HOSPADM

## 2022-08-12 RX ORDER — PREDNISONE 20 MG/1
40 TABLET ORAL 2 TIMES DAILY WITH MEALS
Status: DISCONTINUED | OUTPATIENT
Start: 2022-08-13 | End: 2022-08-13 | Stop reason: HOSPADM

## 2022-08-12 RX ORDER — PREDNISONE 20 MG/1
40 TABLET ORAL
Status: DISCONTINUED | OUTPATIENT
Start: 2022-08-14 | End: 2022-08-13 | Stop reason: HOSPADM

## 2022-08-12 RX ADMIN — METHYLPREDNISOLONE SODIUM SUCCINATE 40 MG: 40 INJECTION, POWDER, FOR SOLUTION INTRAMUSCULAR; INTRAVENOUS at 20:31

## 2022-08-12 RX ADMIN — SULFAMETHOXAZOLE AND TRIMETHOPRIM 2 TABLET: 800; 160 TABLET ORAL at 14:30

## 2022-08-12 RX ADMIN — METHYLPREDNISOLONE SODIUM SUCCINATE 40 MG: 40 INJECTION, POWDER, FOR SOLUTION INTRAMUSCULAR; INTRAVENOUS at 14:30

## 2022-08-12 RX ADMIN — SULFAMETHOXAZOLE AND TRIMETHOPRIM 2 TABLET: 800; 160 TABLET ORAL at 20:31

## 2022-08-12 RX ADMIN — SULFAMETHOXAZOLE AND TRIMETHOPRIM 2 TABLET: 800; 160 TABLET ORAL at 05:02

## 2022-08-12 RX ADMIN — METHYLPREDNISOLONE SODIUM SUCCINATE 40 MG: 40 INJECTION, POWDER, FOR SOLUTION INTRAMUSCULAR; INTRAVENOUS at 05:02

## 2022-08-12 RX ADMIN — Medication 10 ML: at 20:31

## 2022-08-12 NOTE — PLAN OF CARE
Problem: Adult Inpatient Plan of Care  Goal: Plan of Care Review  Outcome: Ongoing, Progressing  Flowsheets (Taken 8/12/2022 0506)  Progress: improving  Plan of Care Reviewed With: patient  Outcome Evaluation:   VSS   on RA/2L NC O2 at times   up ad levon   no c/o pain, soa, or N/V   no acute distress noted   will cont to monitor   Goal Outcome Evaluation:  Plan of Care Reviewed With: patient        Progress: improving  Outcome Evaluation: VSS; on RA/2L NC O2 at times; up ad levon; no c/o pain, soa, or N/V; no acute distress noted; will cont to monitor

## 2022-08-12 NOTE — PROGRESS NOTES
[facility] Services  PROGRESS NOTE    Patient Name: Uri Mirza  : 1990  MRN: [id number]    Date of Admission: 2022  Primary Care Physician: Provider, No Known    Subjective   Subjective     CC:  Follow-up shortness of breath    HPI:  Patient currently breathing comfortably on supplemental oxygen.  She denies any cough.  She is hopeful to go home soon.  No other new complaints.  3 L nasal cannula.    Review of Systems  No current fevers or chills  No current nausea, vomiting, or diarrhea  No current chest pain or palpitations      Objective   Objective     Vital Signs:   Temp:  [97.7 °F (36.5 °C)-98.4 °F (36.9 °C)] 97.7 °F (36.5 °C)  Heart Rate:  [61-79] 61  Resp:  [18] 18  BP: (131-138)/(91-94) 136/94        Physical Exam:  Constitutional:Awake, alert  HENT: NCAT, mucous membranes moist, neck supple  Respiratory: No cough, relatively clear, respiratory effort normal, nonlabored breathing   Cardiovascular: RRR, normal radial pulses  Gastrointestinal: Positive bowel sounds, soft, nontender, nondistended  Musculoskeletal: Normal musculature for age, no lower extremity edema, BMI 23  Psychiatric: Appropriate affect, cooperative, conversational  Neurologic: No slurred speech or facial droop, follows commands  Skin: No rashes or jaundice, warm      Results Reviewed:  Results from last 7 days   Lab Units 08/10/22  0535 22  0735 22  1514   WBC 10*3/mm3 8.72 3.88 4.26   HEMOGLOBIN g/dL 10.2* 10.7* 12.1   HEMATOCRIT % 33.1* 32.8* 37.0   PLATELETS 10*3/mm3 262 265 278   PROCALCITONIN ng/mL  --   --  0.08     Results from last 7 days   Lab Units 08/10/22  0535 22  0735 22  2206 22  1514   SODIUM mmol/L 138 135*  --  137   POTASSIUM mmol/L 4.6 4.5  --  3.5   CHLORIDE mmol/L 107 104  --  99   CO2 mmol/L 23.0 22.0  --  23.3   BUN mg/dL 14 12  --  12   CREATININE mg/dL 0.66 0.62  --  0.72   GLUCOSE mg/dL 157* 166*  --  100*   CALCIUM mg/dL 8.9 8.8  --  8.9   ALT (SGPT) U/L   --   --   --  17   AST (SGOT) U/L  --   --   --  33*   TROPONIN T ng/mL  --   --  <0.010 <0.010   PROBNP pg/mL  --   --   --  14.7     Estimated Creatinine Clearance: 115.1 mL/min (by C-G formula based on SCr of 0.66 mg/dL).    Microbiology Results Abnormal     Procedure Component Value - Date/Time    Tissue / Bone Culture - Tissue, Lung, Left Lower Lobe [[id number]] Collected: 08/10/22 1600    Lab Status: Preliminary result Specimen: Tissue from Lung, Left Lower Lobe Updated: 08/12/22 0831     Tissue Culture No growth at 2 days     Gram Stain Rare (1+) WBCs per low power field      No organisms seen    BAL Culture, Quantitative - Lavage, Lung, Right Lower Lobe [[id number]] Collected: 08/10/22 1552    Lab Status: Final result Specimen: Lavage from Lung, Right Lower Lobe Updated: 08/12/22 0543     BAL Culture No growth     Gram Stain Few (2+) WBCs per low power field      Rare (1+) Epithelial cells per low power field      No organisms seen    Blood Culture - Blood, Arm, Left [[id number]]  (Normal) Collected: 08/08/22 1514    Lab Status: Preliminary result Specimen: Blood from Arm, Left Updated: 08/11/22 1533     Blood Culture No growth at 3 days    Blood Culture - Blood, Arm, Left [[id number]]  (Normal) Collected: 08/09/22 1454    Lab Status: Preliminary result Specimen: Blood from Arm, Left Updated: 08/11/22 1516     Blood Culture No growth at 2 days    AFB Culture - Tissue, Lung, Left Lower Lobe [[id number]] Collected: 08/10/22 1600    Lab Status: Preliminary result Specimen: Tissue from Lung, Left Lower Lobe Updated: 08/11/22 1426     AFB Stain No acid fast bacilli seen on direct smear    AFB Culture - Lavage, Lung, Right Lower Lobe [[id number]] Collected: 08/10/22 1552    Lab Status: Preliminary result Specimen: Lavage from Lung, Right Lower Lobe Updated: 08/11/22 1426     AFB Stain No acid fast bacilli seen on concentrated smear    Respiratory Panel PCR w/COVID-19(SARS-CoV-2) [facility]UAN JOS[facility]I In-House, NP  Swab in UTM/VTM, 3-4 HR TAT - Lavage, Lung, Right Lower Lobe [[id number]]  (Normal) Collected: 08/10/22 1552    Lab Status: Final result Specimen: Lavage from Lung, Right Lower Lobe Updated: 08/10/22 1731     ADENOVIRUS, PCR Not Detected     Coronavirus 229E Not Detected     Coronavirus HKU1 Not Detected     Coronavirus NL63 Not Detected     Coronavirus OC43 Not Detected     COVID19 Not Detected     Human Metapneumovirus Not Detected     Human Rhinovirus/Enterovirus Not Detected     Influenza A PCR Not Detected     Influenza B PCR Not Detected     Parainfluenza Virus 1 Not Detected     Parainfluenza Virus 2 Not Detected     Parainfluenza Virus 3 Not Detected     Parainfluenza Virus 4 Not Detected     RSV, PCR Not Detected     Bordetella pertussis pcr Not Detected     Bordetella parapertussis PCR Not Detected     Chlamydophila pneumoniae PCR Not Detected     Mycoplasma pneumo by PCR Not Detected    Narrative:      In the setting of a positive respiratory panel with a viral infection PLUS a negative procalcitonin without other underlying concern for bacterial infection, consider observing off antibiotics or discontinuation of antibiotics and continue supportive care. If the respiratory panel is positive for atypical bacterial infection (Bordetella pertussis, Chlamydophila pneumoniae, or Mycoplasma pneumoniae), consider antibiotic de-escalation to target atypical bacterial infection.    Respiratory Panel PCR w/COVID-19(SARS-CoV-2) [facility] In-House, NP Swab in UTM/VTM, 3-4 HR TAT - Swab, Nasopharynx [[id number]]  (Normal) Collected: 08/08/22 1711    Lab Status: Final result Specimen: Swab from Nasopharynx Updated: 08/08/22 1842     ADENOVIRUS, PCR Not Detected     Coronavirus 229E Not Detected     Coronavirus HKU1 Not Detected     Coronavirus NL63 Not Detected     Coronavirus OC43 Not Detected     COVID19 Not Detected     Human Metapneumovirus Not Detected     Human Rhinovirus/Enterovirus Not Detected      Influenza A PCR Not Detected     Influenza B PCR Not Detected     Parainfluenza Virus 1 Not Detected     Parainfluenza Virus 2 Not Detected     Parainfluenza Virus 3 Not Detected     Parainfluenza Virus 4 Not Detected     RSV, PCR Not Detected     Bordetella pertussis pcr Not Detected     Bordetella parapertussis PCR Not Detected     Chlamydophila pneumoniae PCR Not Detected     Mycoplasma pneumo by PCR Not Detected    Narrative:      In the setting of a positive respiratory panel with a viral infection PLUS a negative procalcitonin without other underlying concern for bacterial infection, consider observing off antibiotics or discontinuation of antibiotics and continue supportive care. If the respiratory panel is positive for atypical bacterial infection (Bordetella pertussis, Chlamydophila pneumoniae, or Mycoplasma pneumoniae), consider antibiotic de-escalation to target atypical bacterial infection.          Imaging Results (Last 24 Hours)     ** No results found for the last 24 hours. **          Results for orders placed during the hospital encounter of 08/08/22    Adult Transthoracic Echo Complete W/ Cont if Necessary Per Protocol    Interpretation Summary  · Left ventricular ejection fraction appears to be 56 - 60%. Left ventricular systolic function is normal.  · Left ventricular diastolic function was normal.  · Normal right ventricular cavity size and systolic function noted.  · Trace to mild tricuspid valve regurgitation is present.  · Calculated right ventricular systolic pressure from tricuspid regurgitation is 23 mmHg.  · There is a trivial circumferential pericardial effusion. There is no evidence of cardiac tamponade.      I have reviewed the medications:  Scheduled Meds:enoxaparin, 40 mg, Subcutaneous, Nightly  methylPREDNISolone sodium succinate, 40 mg, Intravenous, Q8H  sulfamethoxazole-trimethoprim, 2 tablet, Oral, Q8H      Continuous Infusions:sodium chloride, 30 mL/hr      PRN Meds:.•   acetaminophen  •  benzonatate  •  melatonin  •  nitroglycerin  •  ondansetron **OR** ondansetron  •  [COMPLETED] Insert peripheral IV **AND** sodium chloride  •  sodium chloride    Assessment & Plan   Assessment & Plan     Active Hospital Problems    Diagnosis  POA   • **Pneumocystis pneumonia (HCC) [B59]  Yes   • HIV (human immunodeficiency virus infection) (HCC) [B20]  Yes   • Acute respiratory failure with hypoxia (HCC) [J96.01]  Yes   • Anemia [D64.9]  Yes   • Pneumonia of both lungs due to infectious organism, unspecified part of lung [J18.9]  Yes      Resolved Hospital Problems   No resolved problems to display.        Brief Hospital Course to date:  Uri Mirza is a 32 y.o. female presents the hospital with respiratory failure and was newly diagnosed with HIV and pneumocystis pneumonia.    Discussion/plan for today:  Continue Bactrim.  Awaiting HIV studies.  I have personally weaned down oxygen from 3 L to 2 L.  Plan to continue to gradually wean down today.  Hopefully patient may be able to discharge without supplemental oxygen.  Plan to reach out to infectious disease regarding long-term treatment plan.    Viral studies reviewed and it is noted that stain from body fluid was consistent with pneumocystis.      Patient is incidentally positive for MRSA but this will be covered with Bactrim and is not thought to be the primary pathogen.    Mild anemia stable without bleeding.    Plan to check blood counts tomorrow.  For back to monitoring    Likely home once cleared by infectious disease.    CODE STATUS:   Code Status and Medical Interventions:   Ordered at: 08/08/22 1758     Code Status (Patient has no pulse and is not breathing):    CPR (Attempt to Resuscitate)     Medical Interventions (Patient has pulse or is breathing):    Full       John Stinson MD  08/12/22

## 2022-08-12 NOTE — PROGRESS NOTES
ID note for HIV  Subjective: No pain, tolerating meds. No f/c/ns.  On 2 L  Objective  Temp:  [97.7 °F (36.5 °C)-98.4 °F (36.9 °C)] 97.7 °F (36.5 °C)  Heart Rate:  [61-80] 61  Resp:  [18] 18  BP: (130-138)/(89-94) 136/94  GENERAL: Awake and alert, in no acute distress.   HEENT:  Hearing is grossly normal.   EYES:  No conjunctival injection. No lid lag.   HEART: Regular rate and rhythm. No peripheral edema.   LUNGS: Rales with normal respiratory effort.   GI: Soft, nontender, nondistended. No appreciable organomegaly.   SKIN: Warm and dry without cutaneous eruptions   PSYCHIATRIC: Appropriate mood, flat affect, fair insight, and judgment.     HIV RNA (GT=??? )  --    CD4  --    Hepatitis  --HAV neg 8/2022  --HBV sAb+ all others negative 8/2022  --HCV nonreactive 8/8/2022    STI  --RPR nonreactive 8/2021; 8/2022  --Urine GC    Vaccines  --HAV   --HBV immune  --PPSV 23  --PCV13  --Tdap    Misc  --Tspot  --[id number]    July 2022, Fungitell greater than 500, c-ANCA 1: 160, negative p-ANCA, MPO and SC-3  August 2022 cryptococcal antigen negative, histoplasmosis and blastomycosis pending  August 10, 2022 bronchoscopy studies  -- Red blood cells 269, white blood cells 140, 12% neutrophils  -- Fungal culture pending  -- AFB culture pending  -- Bacterial culture no growth to date  -- Pathology pending    TTE  · Left ventricular ejection fraction appears to be 56 - 60%. Left ventricular systolic function is normal.  · Left ventricular diastolic function was normal.  · Normal right ventricular cavity size and systolic function noted.  · Trace to mild tricuspid valve regurgitation is present.  · Calculated right ventricular systolic pressure from tricuspid regurgitation is 23 mmHg.  · There is a trivial circumferential pericardial effusion. There is no evidence of cardiac tamponade.        Assessment and plan  1.  Acute hypoxic respiratory failure  2.  HIV  3.  Pneumocystis pneumonia  4.  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  Tolerating nicely so far.  BMP and CBC ordered. to make sure stable on antibiotics  F/u bronch studies  Multiple HIV labs pending including HIV viral load, CD4 count and genotype.  Likely start Biktarvy for ART once viral load comes back

## 2022-08-12 NOTE — CASE MANAGEMENT/SOCIAL WORK
Continued Stay Note  [facility]     Patient Name: Uri Mirza  MRN: [id number]  Today's Date: 8/12/2022    Admit Date: 8/8/2022     Discharge Plan     Row Name 08/12/22 1441       Plan    Plan home with oxygen    Patient/Family in Agreement with Plan yes    Plan Comments Pt expected to d/c over the weekend, pt still requiring oxygen. Spoke with Nerissa/Toshia who will deliver tank today, [employer] will be covering over the weekend to notify of d/c for home set up. Pt will need resting room air sat documented and/walking oximetry. Pt denies further d/c needs - Nannette CRAIG               Discharge Codes    No documentation.               Expected Discharge Date and Time     Expected Discharge Date Expected Discharge Time    Aug 12, 2022             Nannette Calle RN

## 2022-08-12 NOTE — PROGRESS NOTES
[facility]  [phone]  Dr. Ricardo Trevino    Subjective:  LOS: 4    Chief Complaint: Dyspnea    On low-flow oxygen.  Feels somewhat better.  Denies cough or phlegm.    Objective   Vital Signs past 24hrs    Temp range: Temp (24hrs), Av.1 °F (36.7 °C), Min:97.7 °F (36.5 °C), Max:98.4 °F (36.9 °C)    BP range: BP: (130-138)/(89-94) 136/94  Pulse range: Heart Rate:  [61-80] 61  Resp rate range: Resp:  [18] 18    Device (Oxygen Therapy): nasal cannulaFlow (L/min):  [3] 3  Oxygen range:SpO2:  [91 %-100 %] 100 %      59.6 kg (131 lb 6.4 oz); Body mass index is 22.55 kg/m².  No intake or output data in the 24 hours ending 22 0902    Physical Exam  Eyes:      Pupils: Pupils are equal, round, and reactive to light.   Cardiovascular:      Rate and Rhythm: Normal rate and regular rhythm.      Heart sounds: No murmur heard.  Pulmonary:      Effort: Pulmonary effort is normal.      Breath sounds: Normal breath sounds.   Abdominal:      General: Bowel sounds are normal.      Palpations: Abdomen is soft. There is no mass.      Tenderness: There is no abdominal tenderness.   Musculoskeletal:         General: No swelling.   Neurological:      Mental Status: She is alert.       Results Review:    I have reviewed the laboratory and imaging data since the last note by LPC physician.  My annotations are noted in assessment and plan.    Medication Review:  I have reviewed the current MAR.  My annotations are noted in assessment and plan.    sodium chloride, 30 mL/hr      Plan   PCCM Problems  Bilateral pulmonary infiltrates  Positive HIV  Recent use of vaping products  PJP pneumonia      Plan of Treatment    PJP on bronchoscopy.  Will adjust steroids for same.  Less likely to be vaping related acute lung injury.    Will see as needed.    Ricardo Trevino MD  22  09:02 EDT    While in the room and during my examination of the patient I wore gloves, gown, mask, eye protection and followed enhanced  droplet/contact isolation protocol and precautions.  I washed my hands before and after this patient encounter.    Part of this note may be an electronic transcription/translation of spoken language to printed text using the Dragon Dictation System.
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
## 2022-08-13 ENCOUNTER — READMISSION MANAGEMENT (OUTPATIENT)
Dept: CALL CENTER | Facility: HOSPITAL | Age: 32
End: 2022-08-13

## 2022-08-13 VITALS
BODY MASS INDEX: 22.57 KG/M2 | SYSTOLIC BLOOD PRESSURE: 115 MMHG | RESPIRATION RATE: 18 BRPM | TEMPERATURE: 97.9 F | HEART RATE: 83 BPM | WEIGHT: 132.2 LBS | OXYGEN SATURATION: 95 % | HEIGHT: 64 IN | DIASTOLIC BLOOD PRESSURE: 82 MMHG

## 2022-08-13 PROBLEM — B20 AIDS (ACQUIRED IMMUNE DEFICIENCY SYNDROME) (HCC): Status: ACTIVE | Noted: 2022-08-13

## 2022-08-13 PROBLEM — U07.0 LUNG INJURY ASSOCIATED WITH VAPING: Status: ACTIVE | Noted: 2022-08-13

## 2022-08-13 PROBLEM — Z22.322 MRSA NASAL COLONIZATION: Status: ACTIVE | Noted: 2022-08-13

## 2022-08-13 PROBLEM — J96.01 ACUTE RESPIRATORY FAILURE WITH HYPOXIA: Status: RESOLVED | Noted: 2022-08-09 | Resolved: 2022-08-13

## 2022-08-13 LAB
ALBUMIN SERPL-MCNC: 3.3 G/DL (ref 3.5–5.2)
ALBUMIN/GLOB SERPL: 0.7 G/DL
ALP SERPL-CCNC: 47 U/L (ref 39–117)
ALT SERPL W P-5'-P-CCNC: 15 U/L (ref 1–33)
ANION GAP SERPL CALCULATED.3IONS-SCNC: 13.1 MMOL/L (ref 5–15)
AST SERPL-CCNC: 22 U/L (ref 1–32)
BACTERIA SPEC AEROBE CULT: NORMAL
BACTERIA SPEC AEROBE CULT: NORMAL
BASOPHILS # BLD AUTO: 0.04 10*3/MM3 (ref 0–0.2)
BASOPHILS NFR BLD AUTO: 0.3 % (ref 0–1.5)
BILIRUB SERPL-MCNC: <0.2 MG/DL (ref 0–1.2)
BUN SERPL-MCNC: 17 MG/DL (ref 6–20)
BUN/CREAT SERPL: 19.1 (ref 7–25)
CALCIUM SPEC-SCNC: 9.1 MG/DL (ref 8.6–10.5)
CHLORIDE SERPL-SCNC: 99 MMOL/L (ref 98–107)
CO2 SERPL-SCNC: 18.9 MMOL/L (ref 22–29)
CREAT SERPL-MCNC: 0.89 MG/DL (ref 0.57–1)
CYTO UR: NORMAL
DEPRECATED RDW RBC AUTO: 41 FL (ref 37–54)
EGFRCR SERPLBLD CKD-EPI 2021: 88.5 ML/MIN/1.73
EOSINOPHIL # BLD AUTO: 0 10*3/MM3 (ref 0–0.4)
EOSINOPHIL NFR BLD AUTO: 0 % (ref 0.3–6.2)
ERYTHROCYTE [DISTWIDTH] IN BLOOD BY AUTOMATED COUNT: 14.3 % (ref 12.3–15.4)
GLOBULIN UR ELPH-MCNC: 4.6 GM/DL
GLUCOSE SERPL-MCNC: 135 MG/DL (ref 65–99)
GRAM STN SPEC: NORMAL
GRAM STN SPEC: NORMAL
HCT VFR BLD AUTO: 37.7 % (ref 34–46.6)
HGB BLD-MCNC: 11.9 G/DL (ref 12–15.9)
HIV1 RNA # SERPL NAA+PROBE: NORMAL COPIES/ML
HIV1 RNA SERPL NAA+PROBE-LOG#: 5.88 LOG10COPY/ML
IMM GRANULOCYTES # BLD AUTO: 0.67 10*3/MM3 (ref 0–0.05)
IMM GRANULOCYTES NFR BLD AUTO: 4.4 % (ref 0–0.5)
LAB AP CASE REPORT: NORMAL
LAB AP CLINICAL INFORMATION: NORMAL
LAB AP DIAGNOSIS COMMENT: NORMAL
LYMPHOCYTES # BLD AUTO: 1.15 10*3/MM3 (ref 0.7–3.1)
LYMPHOCYTES NFR BLD AUTO: 7.6 % (ref 19.6–45.3)
MCH RBC QN AUTO: 25.4 PG (ref 26.6–33)
MCHC RBC AUTO-ENTMCNC: 31.6 G/DL (ref 31.5–35.7)
MCV RBC AUTO: 80.6 FL (ref 79–97)
MONOCYTES # BLD AUTO: 1.62 10*3/MM3 (ref 0.1–0.9)
MONOCYTES NFR BLD AUTO: 10.7 % (ref 5–12)
NEUTROPHILS NFR BLD AUTO: 11.71 10*3/MM3 (ref 1.7–7)
NEUTROPHILS NFR BLD AUTO: 77 % (ref 42.7–76)
NRBC BLD AUTO-RTO: 0.1 /100 WBC (ref 0–0.2)
PATH REPORT.ADDENDUM SPEC: NORMAL
PATH REPORT.FINAL DX SPEC: NORMAL
PATH REPORT.GROSS SPEC: NORMAL
PLATELET # BLD AUTO: 368 10*3/MM3 (ref 140–450)
PMV BLD AUTO: 11 FL (ref 6–12)
POTASSIUM SERPL-SCNC: 4.2 MMOL/L (ref 3.5–5.2)
PROT SERPL-MCNC: 7.9 G/DL (ref 6–8.5)
RBC # BLD AUTO: 4.68 10*6/MM3 (ref 3.77–5.28)
SODIUM SERPL-SCNC: 131 MMOL/L (ref 136–145)
WBC NRBC COR # BLD: 15.19 10*3/MM3 (ref 3.4–10.8)

## 2022-08-13 PROCEDURE — 80053 COMPREHEN METABOLIC PANEL: CPT | Performed by: INTERNAL MEDICINE

## 2022-08-13 PROCEDURE — 85025 COMPLETE CBC W/AUTO DIFF WBC: CPT | Performed by: INTERNAL MEDICINE

## 2022-08-13 PROCEDURE — 63710000001 PREDNISONE PER 1 MG: Performed by: INTERNAL MEDICINE

## 2022-08-13 PROCEDURE — 99233 SBSQ HOSP IP/OBS HIGH 50: CPT | Performed by: INTERNAL MEDICINE

## 2022-08-13 RX ORDER — FAMOTIDINE 20 MG/1
20 TABLET, FILM COATED ORAL 2 TIMES DAILY PRN
Start: 2022-08-13

## 2022-08-13 RX ORDER — PREDNISONE 10 MG/1
TABLET ORAL
Qty: 101 TABLET | Refills: 0 | Status: SHIPPED | OUTPATIENT
Start: 2022-08-13 | End: 2022-09-23

## 2022-08-13 RX ORDER — ACETAMINOPHEN 325 MG/1
650 TABLET ORAL EVERY 6 HOURS PRN
Start: 2022-08-13

## 2022-08-13 RX ORDER — BICTEGRAVIR SODIUM, EMTRICITABINE, AND TENOFOVIR ALAFENAMIDE FUMARATE 50; 200; 25 MG/1; MG/1; MG/1
1 TABLET ORAL DAILY
Qty: 30 TABLET | Refills: 1 | Status: SHIPPED | OUTPATIENT
Start: 2022-08-13

## 2022-08-13 RX ORDER — PREDNISONE 10 MG/1
TABLET ORAL
Qty: 101 TABLET | Refills: 0
Start: 2022-08-13 | End: 2022-08-13 | Stop reason: SDUPTHER

## 2022-08-13 RX ORDER — SULFAMETHOXAZOLE AND TRIMETHOPRIM 800; 160 MG/1; MG/1
2 TABLET ORAL EVERY 8 HOURS
Qty: 108 TABLET | Refills: 0 | Status: SHIPPED | OUTPATIENT
Start: 2022-08-13 | End: 2022-08-31

## 2022-08-13 RX ADMIN — SULFAMETHOXAZOLE AND TRIMETHOPRIM 2 TABLET: 800; 160 TABLET ORAL at 06:20

## 2022-08-13 RX ADMIN — SULFAMETHOXAZOLE AND TRIMETHOPRIM 2 TABLET: 800; 160 TABLET ORAL at 13:33

## 2022-08-13 RX ADMIN — BICTEGRAVIR SODIUM, EMTRICITABINE, AND TENOFOVIR ALAFENAMIDE FUMARATE 1 TABLET: 50; 200; 25 TABLET ORAL at 13:33

## 2022-08-13 RX ADMIN — PREDNISONE 40 MG: 20 TABLET ORAL at 08:36

## 2022-08-13 NOTE — PROGRESS NOTES
[facility]  [phone]  Dr. Ricardo Trevino    Subjective:  LOS: 5    Chief Complaint: Dyspnea    Tolerating room air.  Feels better.    Objective   Vital Signs past 24hrs    Temp range: Temp (24hrs), Av.1 °F (36.7 °C), Min:97.9 °F (36.6 °C), Max:98.4 °F (36.9 °C)    BP range: BP: (115-125)/(82-83) 115/82  Pulse range: Heart Rate:  [83] 83  Resp rate range: Resp:  [18] 18    Device (Oxygen Therapy): room airFlow (L/min):  [2] 2  Oxygen range:SpO2:  [95 %] 95 %      60 kg (132 lb 3.2 oz); Body mass index is 22.69 kg/m².    Intake/Output Summary (Last 24 hours) at 2022 1043  Last data filed at 2022 1700  Gross per 24 hour   Intake 480 ml   Output --   Net 480 ml       Physical Exam  Eyes:      Pupils: Pupils are equal, round, and reactive to light.   Cardiovascular:      Rate and Rhythm: Normal rate and regular rhythm.      Heart sounds: No murmur heard.  Pulmonary:      Effort: Pulmonary effort is normal.      Breath sounds: Normal breath sounds.   Abdominal:      General: Bowel sounds are normal.      Palpations: Abdomen is soft. There is no mass.      Tenderness: There is no abdominal tenderness.   Musculoskeletal:         General: No swelling.   Neurological:      Mental Status: She is alert.       Results Review:    I have reviewed the laboratory and imaging data since the last note by LPC physician.  My annotations are noted in assessment and plan.    Medication Review:  I have reviewed the current MAR.  My annotations are noted in assessment and plan.    sodium chloride, 30 mL/hr      Plan   PCCM Problems  Bilateral pulmonary infiltrates  Positive HIV  Recent use of vaping products  PJP pneumonia  Possible second fungus on BAL, histoplasma      Plan of Treatment    PJP on bronchoscopy.  Will need 3 weeks of prednisone as directed.  Complete out Bactrim course.    Histoplasma seen on BAL but pending confirmation.  ID will address.    Now on room air.  We will sign off.    Ricardo  MD Uriel  08/13/22  10:43 EDT    While in the room and during my examination of the patient I wore gloves, gown, mask, eye protection and followed enhanced droplet/contact isolation protocol and precautions.  I washed my hands before and after this patient encounter.    Part of this note may be an electronic transcription/translation of spoken language to printed text using the Dragon Dictation System.

## 2022-08-13 NOTE — PROGRESS NOTES
ID note for HIV  Subjective: No pain, tolerating meds. No f/c/ns.  Off o2. Breathing improved  Objective  Temp:  [97.9 °F (36.6 °C)-98.4 °F (36.9 °C)] 97.9 °F (36.6 °C)  Heart Rate:  [83] 83  Resp:  [18] 18  BP: (115-125)/(82-83) 115/82  GENERAL: Awake and alert, in no acute distress.   HEENT:  Hearing is grossly normal.   EYES:  No conjunctival injection. No lid lag.   HEART: Regular rate and rhythm. No peripheral edema.   LUNGS: Rales with normal respiratory effort.   GI: Soft, nontender, nondistended. No appreciable organomegaly.   SKIN: Warm and dry without cutaneous eruptions   PSYCHIATRIC: Appropriate mood, flat affect, fair insight, and judgment.     HIV RNA (GT=??? )  --    CD4  --8/2022 14/2%    Hepatitis  --HAV neg 8/2022  --HBV sAb+ all others negative 8/2022  --HCV nonreactive 8/8/2022    STI  --RPR nonreactive 8/2021; 8/2022  --Urine GC neg 8/21, 8/22    Vaccines  --HAV   --HBV immune  --PPSV 23  --PCV13  --Tdap    Misc  --Tspot  --[id number]    July 2022, Fungitell greater than 500, c-ANCA 1: 160, negative p-ANCA, MPO and SD-3  August 2022 cryptococcal antigen negative, histoplasmosis and blastomycosis pending  August 10, 2022 bronchoscopy studies  -- Red blood cells 269, white blood cells 140, 12% neutrophils  -- Fungal culture pending  -- AFB culture pending  -- Bacterial culture no growth to date  -- Pathology Pneumocystis pneumonia; cytology +pneumocystitis and possible second fungus    TTE  · Left ventricular ejection fraction appears to be 56 - 60%. Left ventricular systolic function is normal.  · Left ventricular diastolic function was normal.  · Normal right ventricular cavity size and systolic function noted.  · Trace to mild tricuspid valve regurgitation is present.  · Calculated right ventricular systolic pressure from tricuspid regurgitation is 23 mmHg.  · There is a trivial circumferential pericardial effusion. There is no evidence of cardiac tamponade.        Assessment and plan  1.  Acute  hypoxic respiratory failure, resolved  2.  HIV/Advanced AIDS  3.  Pneumocystis pneumonia  4.  Positive c-ANCA, uncertain significance    HIV viral load pending but cd4 very low on 14, confirming advanced AIDS. Start biktarvy one tab daily. 3 weeks of samples given to pt and Rx send to [facility] speciality pharmacy.    Cytology with PJP and possible second fungus.  We will await fungal cx and diagnostics to see if we need to add additional antifungal therapy    Continue Bactrim 2 double strength tablets every 8 hours along with steroids for treatment of steroids.  Tolerating nicely so far.  BMP and CBC ordered. to make sure stable on antibiotics. D/w Dr Trevino and will taper steroids over three weeks.    F/u with me in 1 week, appt requested    D/w Dr Stinson and no objection if labs look okay on bactrim

## 2022-08-13 NOTE — NURSING NOTE
"Access following up on depression:    Pt is RIB. She reported she is discharging shortly. She stated she feels \"fine\". She denied any anxiety, depression or SI. The pt was offered outpatient mental health resources, however, she declined. Access will sign off.  "

## 2022-08-13 NOTE — PLAN OF CARE
Problem: Adult Inpatient Plan of Care  Goal: Plan of Care Review  Outcome: Met  Flowsheets  Taken 8/13/2022 1718 by Ashley Cruz RN  Plan of Care Reviewed With: patient  Outcome Evaluation:   d/c to home on RA.  Prescriptions sent with patient   teaching complete.  Taken 8/13/2022 0459 by Danica Swan RN  Progress: no change  Goal: Patient-Specific Goal (Individualized)  Outcome: Met  Goal: Absence of Hospital-Acquired Illness or Injury  Outcome: Met  Intervention: Identify and Manage Fall Risk  Recent Flowsheet Documentation  Taken 8/13/2022 0800 by Ashley Cruz RN  Safety Promotion/Fall Prevention: safety round/check completed  Goal: Optimal Comfort and Wellbeing  Outcome: Met  Intervention: Provide Person-Centered Care  Recent Flowsheet Documentation  Taken 8/13/2022 0800 by Ashley Cruz RN  Trust Relationship/Rapport: care explained  Goal: Readiness for Transition of Care  Outcome: Met     Problem: Fall Injury Risk  Goal: Absence of Fall and Fall-Related Injury  Outcome: Met  Intervention: Promote Injury-Free Environment  Recent Flowsheet Documentation  Taken 8/13/2022 0800 by Ashley Cruz RN  Safety Promotion/Fall Prevention: safety round/check completed     Problem: Infection (Pneumonia)  Goal: Resolution of Infection Signs and Symptoms  Outcome: Met     Problem: Respiratory Compromise (Pneumonia)  Goal: Effective Oxygenation and Ventilation  Outcome: Met     Problem: Skin Injury Risk Increased  Goal: Skin Health and Integrity  Outcome: Met   Goal Outcome Evaluation:  Plan of Care Reviewed With: patient           Outcome Evaluation: d/c to home on RA.  Prescriptions sent with patient; teaching complete.

## 2022-08-13 NOTE — DISCHARGE SUMMARY
[facility]  DISCHARGE SUMMARY    Patient Name: Uri Mirza  : 1990  MRN: [id number]    Date of Admission: 2022  2:43 PM  Date of Discharge: 2022    Consults     Date and Time Order Name Status Description    8/10/2022 10:33 AM Inpatient Infectious Diseases Consult Completed     2022  5:58 PM Inpatient Pulmonology Consult      2022  5:39 PM [facility] (on-call MD unless specified) Details      2022  3:25 AM Inpatient Pulmonology Consult Completed           Hospital Course       Active Hospital Problems    Diagnosis  POA   • **Pneumocystis pneumonia (HCC) [B59]  Yes   • AIDS (acquired immune deficiency syndrome) (HCC) [B20]  Yes   • MRSA nasal colonization [Z22.322]  Not Applicable   • Lung injury associated with 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 female presents the hospital with respiratory failure and was newly diagnosed with HIV and pneumocystis pneumonia.    Patient was treated with the assistance of infectious disease and pulmonology consult teams.  She responded well to Bactrim and steroid therapy.  She required supplemental oxygen for respiratory failure and hypoxia which has now been weaned down to room air.  Patient was started on Biktarvy for HIV and will follow-up in infectious disease clinic for continued management of this issue.  CD4 count is low at 14 consistent with AIDS diagnosis.  Pulmonology was also concerned about a possible vaping lung injury.  Patient has been counseled about no longer vaping and will follow-up in pulmonology clinic for some steroid tapering as needed.  Patient was also  incidentally found to have nasal screen positive for MRSA but is not felt to be acutely infected with MRSA as pneumocystis is felt to be the primary pathogen in the lungs.    Patient has had some mild anemia without bleeding.  This is likely multifactorial and can be followed up outpatient at follow-up.    Patient has been cleared for discharge by the consultant team and is in agreement for the discharge plan.  She seems to be motivated to control her illness and follow-up closely and take her medications as prescribed.    At the time of discharge patient was told to take all medications as prescribed, keep all follow-up appointments, and call their doctor or return to the hospital with any worsening or concerning symptoms.    Please note that this note was made using Dragon voice recognition software            Day of Discharge     HPI:   Feels better.  Breathing comfortably on room air oxygen.  Denies any new complaints.  Agrees with plan to start Biktarvy and follow-up closely with infectious disease.  Agrees to follow-up with pulmonology.  Eager to discharge as soon as possible.  No other new complaints.    ROS:  No current fevers or chills  No current shortness of breath or cough  No current nausea, vomiting, or diarrhea  No current chest pain or palpitations    Vital Signs:   Temp:  [97.9 °F (36.6 °C)-98.4 °F (36.9 °C)] 97.9 °F (36.6 °C)  Heart Rate:  [83] 83  Resp:  [18] 18  BP: (115-125)/(82-83) 115/82     Physical Exam:  Constitutional:Awake, alert  HENT: NCAT, mucous membranes moist, neck supple  Respiratory: Clear to auscultation bilaterally, respiratory effort normal, nonlabored breathing, now on room air  Cardiovascular: RRR, normal radial pulses  Gastrointestinal: Positive bowel sounds, soft, nontender, nondistended  Musculoskeletal: Normal musculature for age, no lower extremity edema, BMI 22  Psychiatric: Appropriate affect, cooperative, conversational  Neurologic: No slurred speech or facial droop,  follows commands  Skin: No rashes or jaundice, warm      Pertinent  and/or Most Recent Results     Results from last 7 days   Lab Units 08/13/22  0851 08/11/22  0857 08/10/22  0535 08/09/22  0735 08/08/22  1514   WBC 10*3/mm3 15.19* 17.1* 8.72 3.88 4.26   HEMOGLOBIN g/dL 11.9* 10.9* 10.2* 10.7* 12.1   HEMATOCRIT % 37.7 33.9* 33.1* 32.8* 37.0   PLATELETS 10*3/mm3 368 303 262 265 278   SODIUM mmol/L 131*  --  138 135* 137   POTASSIUM mmol/L 4.2  --  4.6 4.5 3.5   CHLORIDE mmol/L 99  --  107 104 99   CO2 mmol/L 18.9*  --  23.0 22.0 23.3   BUN mg/dL 17  --  14 12 12   CREATININE mg/dL 0.89  --  0.66 0.62 0.72   GLUCOSE mg/dL 135*  --  157* 166* 100*   CALCIUM mg/dL 9.1  --  8.9 8.8 8.9     Leukocytosis most likely reactive due to steroids.    Results from last 7 days   Lab Units 08/13/22  0851 08/08/22  1514   BILIRUBIN mg/dL <0.2 0.2   ALK PHOS U/L 47 54   ALT (SGPT) U/L 15 17   AST (SGOT) U/L 22 33*           Invalid input(s): TG, LDLCALC, LDLREALC  Results from last 7 days   Lab Units 08/08/22  2206 08/08/22  1514   PROBNP pg/mL  --  14.7   TROPONIN T ng/mL <0.010 <0.010   PROCALCITONIN ng/mL  --  0.08   LACTATE mmol/L  --  1.5       Brief Urine Lab Results  (Last result in the past 365 days)      Color   Clarity   Blood   Leuk Est   Nitrite   Protein   CREAT   Urine HCG        08/10/22 2058 Yellow   Clear   Negative   Negative   Negative   Negative                 Microbiology Results Abnormal     Procedure Component Value - Date/Time    Tissue / Bone Culture - Tissue, Lung, Left Lower Lobe [[id number]] Collected: 08/10/22 1600    Lab Status: Final result Specimen: Tissue from Lung, Left Lower Lobe Updated: 08/13/22 0657     Tissue Culture No growth at 3 days     Gram Stain Rare (1+) WBCs per low power field      No organisms seen    Chlamydia trachomatis, Neisseria gonorrhoeae, PCR - Urine, Urine, Clean Catch [[id number]] Collected: 08/10/22 2058    Lab Status: Final result Specimen: Urine, Clean Catch Updated:  08/12/22 2107     Chlamydia trachomatis, BRONWYN Negative     Neisseria gonorrhoeae, BRONWYN Negative    Narrative:      Performed at:  [address] [place], [place] [address]Tomás barber W  [id number]  : Audra Monique MD, Phone:  [phone]    Blood Culture - Blood, Arm, Left [[id number]]  (Normal) Collected: 08/08/22 1514    Lab Status: Preliminary result Specimen: Blood from Arm, Left Updated: 08/12/22 1533     Blood Culture No growth at 4 days    Blood Culture - Blood, Arm, Left [[id number]]  (Normal) Collected: 08/09/22 1454    Lab Status: Preliminary result Specimen: Blood from Arm, Left Updated: 08/12/22 1517     Blood Culture No growth at 3 days    BAL Culture, Quantitative - Lavage, Lung, Right Lower Lobe [[id number]] Collected: 08/10/22 1552    Lab Status: Final result Specimen: Lavage from Lung, Right Lower Lobe Updated: 08/12/22 0543     BAL Culture No growth     Gram Stain Few (2+) WBCs per low power field      Rare (1+) Epithelial cells per low power field      No organisms seen    AFB Culture - Tissue, Lung, Left Lower Lobe [[id number]] Collected: 08/10/22 1600    Lab Status: Preliminary result Specimen: Tissue from Lung, Left Lower Lobe Updated: 08/11/22 1426     AFB Stain No acid fast bacilli seen on direct smear    AFB Culture - Lavage, Lung, Right Lower Lobe [[id number]] Collected: 08/10/22 1552    Lab Status: Preliminary result Specimen: Lavage from Lung, Right Lower Lobe Updated: 08/11/22 1426     AFB Stain No acid fast bacilli seen on concentrated smear    Respiratory Panel PCR w/COVID-19(SARS-CoV-2) [facility] In-House, NP Swab in UTM/VTM, 3-4 HR TAT - Lavage, Lung, Right Lower Lobe [[id number]]  (Normal) Collected: 08/10/22 1552    Lab Status: Final result Specimen: Lavage from Lung, Right Lower Lobe Updated: 08/10/22 1731     ADENOVIRUS, PCR Not Detected     Coronavirus 229E Not Detected     Coronavirus HKU1 Not Detected     Coronavirus NL63 Not Detected     Coronavirus OC43  Not Detected     COVID19 Not Detected     Human Metapneumovirus Not Detected     Human Rhinovirus/Enterovirus Not Detected     Influenza A PCR Not Detected     Influenza B PCR Not Detected     Parainfluenza Virus 1 Not Detected     Parainfluenza Virus 2 Not Detected     Parainfluenza Virus 3 Not Detected     Parainfluenza Virus 4 Not Detected     RSV, PCR Not Detected     Bordetella pertussis pcr Not Detected     Bordetella parapertussis PCR Not Detected     Chlamydophila pneumoniae PCR Not Detected     Mycoplasma pneumo by PCR Not Detected    Narrative:      In the setting of a positive respiratory panel with a viral infection PLUS a negative procalcitonin without other underlying concern for bacterial infection, consider observing off antibiotics or discontinuation of antibiotics and continue supportive care. If the respiratory panel is positive for atypical bacterial infection (Bordetella pertussis, Chlamydophila pneumoniae, or Mycoplasma pneumoniae), consider antibiotic de-escalation to target atypical bacterial infection.    Respiratory Panel PCR w/COVID-19(SARS-CoV-2) [facility] In-House, NP Swab in UTM/VTM, 3-4 HR TAT - Swab, Nasopharynx [[id number]]  (Normal) Collected: 08/08/22 1711    Lab Status: Final result Specimen: Swab from Nasopharynx Updated: 08/08/22 1842     ADENOVIRUS, PCR Not Detected     Coronavirus 229E Not Detected     Coronavirus HKU1 Not Detected     Coronavirus NL63 Not Detected     Coronavirus OC43 Not Detected     COVID19 Not Detected     Human Metapneumovirus Not Detected     Human Rhinovirus/Enterovirus Not Detected     Influenza A PCR Not Detected     Influenza B PCR Not Detected     Parainfluenza Virus 1 Not Detected     Parainfluenza Virus 2 Not Detected     Parainfluenza Virus 3 Not Detected     Parainfluenza Virus 4 Not Detected     RSV, PCR Not Detected     Bordetella pertussis pcr Not Detected     Bordetella parapertussis PCR Not Detected     Chlamydophila  pneumoniae PCR Not Detected     Mycoplasma pneumo by PCR Not Detected    Narrative:      In the setting of a positive respiratory panel with a viral infection PLUS a negative procalcitonin without other underlying concern for bacterial infection, consider observing off antibiotics or discontinuation of antibiotics and continue supportive care. If the respiratory panel is positive for atypical bacterial infection (Bordetella pertussis, Chlamydophila pneumoniae, or Mycoplasma pneumoniae), consider antibiotic de-escalation to target atypical bacterial infection.          Imaging Results (All)     Procedure Component Value Units Date/Time    XR Chest 2 View [[id number]] Collected: 08/12/22 1733     Updated: 08/12/22 1737    Narrative:      XR CHEST 2 VW-     HISTORY:  Shortness of breath, weight loss, follow-up infiltrates.     COMPARISON:  Chest radiograph 08/08/2022     FINDINGS:    3 views of the chest were obtained.     Support Devices:  None.  Cardiac Silhouette/Mediastinum/Ashley:  The cardiac silhouette is upper  normal to mildly enlarged. Mediastinal and hilar contours are not  significantly changed.  Lungs/Pleural Spaces:  There are bibasilar airspace opacities, improved  from 08/08/2022. Pleural spaces are clear.  Chest Wall/Diaphragm/Upper Abdomen:  There is thoracic dextroscoliosis.     This report was finalized on 8/12/2022 5:34 PM by Dr. Tatiana Mccall M.D.       FL C Arm During Surgery [[id number]] Collected: 08/10/22 1622     Updated: 08/10/22 1626    Narrative:      XR CHEST 1 VW-, FL C ARM DURING SURGERY-     HISTORY: Female who is 32 years-old,  left lung biopsy     TECHNIQUE: FLUOROSCOPIC ASSISTANCE IN THE OPERATING ROOM.     FINDINGS:     3 intraoperative fluoroscopic spot views were obtained and recorded the  PACS for review, revealing procedure at the level of the left lung.  Please see operative report for full details.     Fluoroscopy time: 68 seconds       Impression:         As described.     This  report was finalized on 8/10/2022 4:23 PM by Dr. Terrell Su M.D.       XR Chest 1 View [[id number]] Collected: 08/10/22 1622     Updated: 08/10/22 1626    Narrative:      XR CHEST 1 VW-, FL C ARM DURING SURGERY-     HISTORY: Female who is 32 years-old,  left lung biopsy     TECHNIQUE: FLUOROSCOPIC ASSISTANCE IN THE OPERATING ROOM.     FINDINGS:     3 intraoperative fluoroscopic spot views were obtained and recorded the  PACS for review, revealing procedure at the level of the left lung.  Please see operative report for full details.     Fluoroscopy time: 68 seconds       Impression:         As described.     This report was finalized on 8/10/2022 4:23 PM by Dr. Terrell Su M.D.       CT Angiogram Chest [[id number]] Collected: 08/08/22 1711     Updated: 08/08/22 2116    Narrative:      CT ANGIOGRAM OF THE CHEST WITH CONTRAST     HISTORY: 32-year-old female with a history of shortness of air, weight  loss and elevated d-dimer, rule out pulmonary embolism.      TECHNIQUE: Contiguous axial images were obtained through the chest  following bolus intravenous administration of nonionic contrast. Three-D  reformatted images were produced and presented for interpretation.     COMPARISON: CT angiogram of the chest with contrast, 07/09/2022.     FINDINGS: No filling defects are seen within the pulmonary arterial  system. The RV to LV ratio is less than 1. Reidentified are multiple  opacities seen diffusely throughout the bilateral upper lobes, bilateral  lower lobes, right middle lobe and lingula. Improved aeration with a  reduction in opacification is noted within the bilateral upper lobes and  the infiltrates in this region have a more ground glass appearance. The  osseous structures appear normal. The heart and great vessels appear  normal. The visualized soft tissue structures of the upper abdomen  appear normal.       Impression:      1. There is no evidence for a pulmonary embolism.   2. There has been  mild interval improvement in multilobar bilateral  pneumonia, particularly within the bilateral upper lobes.     These findings were communicated to Sanket Youngblood MD, in the emergency  room on 08/08/2022 at 4:50 PM.     Radiation dose reduction techniques were utilized, including automated  exposure control and exposure modulation based on body size.     This report was finalized on 8/8/2022 9:13 PM by Dr. Billy Manuel M.D.       XR Chest 1 View [[id number]] Collected: 08/08/22 1523     Updated: 08/08/22 1528    Narrative:      PORTAL CHEST X-RAY     HISTORY: Shortness of breath.     TECHNIQUE: Portable chest x-ray is provided and correlated with chest CT  July 9, 2022.     FINDINGS: Bony vasculature is engorged and there is increased density in  the mid and lower lung zones. This is favored represent florid pulmonary  edema. No pleural effusion evident. No pneumothorax.       Impression:      Symmetric opacity in the mid and lower lung zones  bilaterally favored represent bilateral pulmonary edema.     This report was finalized on 8/8/2022 3:25 PM by Dr. Marvin Gautam M.D.             Results for orders placed during the hospital encounter of 07/08/22    Duplex Venous Lower Extremity - Bilateral CAR    Interpretation Summary  · Normal bilateral lower extremity venous duplex scan.  · Some inguinal lymphadenopathy is noted.      Results for orders placed during the hospital encounter of 07/08/22    Duplex Venous Lower Extremity - Bilateral CAR    Interpretation Summary  · Normal bilateral lower extremity venous duplex scan.  · Some inguinal lymphadenopathy is noted.      Results for orders placed during the hospital encounter of 08/08/22    Adult Transthoracic Echo Complete W/ Cont if Necessary Per Protocol    Interpretation Summary  · Left ventricular ejection fraction appears to be 56 - 60%. Left ventricular systolic function is normal.  · Left ventricular diastolic function was normal.  · Normal right  ventricular cavity size and systolic function noted.  · Trace to mild tricuspid valve regurgitation is present.  · Calculated right ventricular systolic pressure from tricuspid regurgitation is 23 mmHg.  · There is a trivial circumferential pericardial effusion. There is no evidence of cardiac tamponade.      Plan for Follow-up of Pending Labs/Results: Infectious disease and pulmonology clinics  Pending Labs     Order Current Status    Aspergillus Fumigatus IgE In process    Blastomyces Antigen - Urine, Urine, Clean Catch In process    Fungus Culture - Lavage, Lung, Right Lower Lobe In process    Fungus Culture - Tissue, Lung, Left Lower Lobe In process    HIV PhenoSense GT Genotype In process    HIV RNA Real Time PCR (Non-Graph) In process    Histoplasma Ag Ur - Urine, Urine, Clean Catch In process    AFB Culture - Lavage, Lung, Right Lower Lobe Preliminary result    AFB Culture - Tissue, Lung, Left Lower Lobe Preliminary result    Blood Culture - Blood, Arm, Left Preliminary result    Blood Culture - Blood, Arm, Left Preliminary result    Non-gynecologic Cytology Edited        Discharge Details        Discharge Medications      New Medications      Instructions Start Date   acetaminophen 325 MG tablet  Commonly known as: TYLENOL   650 mg, Oral, Every 6 Hours PRN      Biktarvy -25 MG per tablet  Generic drug: Bictegravir-Emtricitab-Tenofov   1 tablet, Oral, Daily      famotidine 20 MG tablet  Commonly known as: Pepcid   20 mg, Oral, 2 Times Daily PRN, OTC      sulfamethoxazole-trimethoprim 800-160 MG per tablet  Commonly known as: BACTRIM DS,SEPTRA DS   2 tablets, Oral, Every 8 Hours         Changes to Medications      Instructions Start Date   predniSONE 10 MG tablet  Commonly known as: DELTASONE  What changed: See the new instructions.   Take 4 tablets by mouth Daily With Breakfast for 5 days, THEN 3.5 tablets Daily With Breakfast for 7 days, THEN 3 tablets Daily With Breakfast for 7 days, THEN 2.5 tablets  Daily With Breakfast for 7 days, THEN 2 tablets Daily With Breakfast for 5 days, THEN 1 tablet Daily With Breakfast for 5 days, THEN 0.5 tablets Daily With Breakfast for 5 days.   Start Date: August 13, 2022            Allergies   Allergen Reactions   • Banana Itching   • Mushroom Provider Review Needed         Discharge Disposition:  Home or Self Care    Diet:  Hospital:  Diet Order   Procedures   • Diet Regular; Cardiac       Activity:  Activity Instructions     Activity as Tolerated                 CODE STATUS:    Code Status and Medical Interventions:   Ordered at: 08/08/22 1759     Code Status (Patient has no pulse and is not breathing):    CPR (Attempt to Resuscitate)     Medical Interventions (Patient has pulse or is breathing):    Full       Additional Instructions for the Follow-ups that You Need to Schedule     Discharge Follow-up with Specialty: Pulmonology clinic Dr. Trevino in 2 to 3 weeks for follow-up of lung issues and to adjust steroids as needed.   As directed      Specialty: Pulmonology clinic Dr. Trevino in 2 to 3 weeks for follow-up of lung issues and to adjust steroids as needed.         Discharge Follow-up with Specified Provider: Follow-up with infectious disease within 1 week for follow-up of pneumocystis pneumonia.   As directed      To: Follow-up with infectious disease within 1 week for follow-up of pneumocystis pneumonia.         Discharge Follow-up with Specified Provider: Recommend to establish a primary care provider and follow-up for general medical issues.  If you do not have a primary care provider please speak with case management before discharge.   As directed      To: Recommend to establish a primary care provider and follow-up for general medical issues.  If you do not have a primary care provider please speak with case management before discharge.            Contact information for follow-up providers     Provider, No Known .    Contact information:  Psychiatric  SYSTEM  [facility] [address]  [phone]                   Contact information for after-discharge care     Durable Medical Equipment     [facility] .    Service: Durable Medical Equipment  Contact information:  [address]  [facility] [address]  [phone]                                 John Stinson MD  08/13/22      Time Spent on Discharge:  I spent greater than 35 minutes on this discharge activity which included: face-to-face encounter with the patient, reviewing the data in the system, coordination of the care with the nursing staff as well as consultants, documentation, and entering orders.

## 2022-08-13 NOTE — PLAN OF CARE
Goal Outcome Evaluation:  Plan of Care Reviewed With: patient        Progress: no change   No respiratory distress noted. Rested well at intervals. Ambulates to bathroom. Tolerates activity well.

## 2022-08-14 LAB — BACTERIA SPEC AEROBE CULT: NORMAL

## 2022-08-14 NOTE — CASE MANAGEMENT/SOCIAL WORK
Case Management Discharge Note      Final Note: Pt discharged home on 8/13.   LIN Weeks RN         Selected Continued Care - Discharged on 8/13/2022 Admission date: 8/8/2022 - Discharge disposition: Home or Self Care    Destination    No services have been selected for the patient.              Durable Medical Equipment     Service Provider Selected Services Address Phone Fax Patient Preferred    [facility]  Durable Medical Equipment [address] [place] [facility] [facility] [address] [phone] --       Internal Comment last updated by Nannette Calle RN 8/11/2022 1443    oxygen                     Dialysis/Infusion    No services have been selected for the patient.              Home Medical Care    No services have been selected for the patient.              Therapy    No services have been selected for the patient.              Community Resources    No services have been selected for the patient.              Community & DME    No services have been selected for the patient.                Selected Continued Care - Prior Encounters Includes selections from prior encounters from 5/10/2022 to 8/13/2022    Discharged on 7/11/2022 Admission date: 7/8/2022 - Discharge disposition: Home or Self Care    Durable Medical Equipment     Service Provider Selected Services Address Phone Fax Patient Preferred    [facility]  Durable Medical Equipment [address], [facility] [address] [phone] --       Internal Comment last updated by Hilda Mast RN 7/11/2022 1006    Patient may need home oxygen.                                Transportation Services  Private: Car    Final Discharge Disposition Code: 01 - home or self-care

## 2022-08-14 NOTE — OUTREACH NOTE
Prep Survey    Flowsheet Row Responses   [facility] patient discharged [facility]? [facility]   Is LACE score < 7 ? No   Emergency Room discharge w/ pulse ox? No   Eligibility Readm Mgmt   Discharge diagnosis Pneumocystis pneumonia    Does the patient have one of the following disease processes/diagnoses(primary or secondary)? COPD/Pneumonia   Does the patient have Home health ordered? No   Is there a DME ordered? Yes   What DME was ordered? [facility] - Oxygen   Prep survey completed? Yes          CRISTIAN CAMERON - Registered Nurse

## 2022-08-15 ENCOUNTER — READMISSION MANAGEMENT (OUTPATIENT)
Dept: CALL CENTER | Facility: HOSPITAL | Age: 32
End: 2022-08-15

## 2022-08-15 LAB
A FUMIGATUS IGE QN: 0.17 KU/L
REF LAB TEST METHOD: NORMAL
REF LAB TEST METHOD: NORMAL

## 2022-08-15 NOTE — OUTREACH NOTE
COPD/PN Week 1 Survey    Flowsheet Row Responses   [facility] patient discharged from? [place]   Does the patient have one of the following disease processes/diagnoses(primary or secondary)? COPD/Pneumonia   Was the primary reason for admission: Pneumonia   Week 1 attempt successful? Yes   Call start time 1101   Call end time 1118   Discharge diagnosis Pneumocystis pneumonia    Meds reviewed with patient/caregiver? Yes   Is the patient having any side effects they believe may be caused by any medication additions or changes? No   Does the patient have all medications ordered at discharge? No   What is keeping the patient from filling the prescriptions? --  [Rx was called into the wrong pharmacy]   Nursing Interventions Nurse called pharmacy, Nurse provided patient education  [Nurse tried to call pharmacy but no one answered x2, routed message to CM, and enc pt to call pharmacy to try to get it switched]   Is the patient taking all medications as directed (includes completed medication regime)? Yes   Does the patient have a primary care provider?  No   PCP Nursing Intervention Provided number to obtain PCP   Does the patient have an appointment with their PCP or specialist within 7 days of discharge? No   What is preventing the patient from scheduling follow up appointments within 7 days of discharge? Unsure of when or with whom   Nursing Interventions Educated patient on importance of making appointment   Has the patient kept scheduled appointments due by today? N/A   Has home health visited the patient within 72 hours of discharge? N/A   What DME was ordered? [facility] - Oxygen   Has all DME been delivered? No  [pt left the oxygen tank at the hospital, stated she did not need it at this time]   Pulse Ox monitoring None   Psychosocial issues? No   Did the patient receive a copy of their discharge instructions? Yes   Nursing interventions Reviewed instructions with patient   What is the  patient's perception of their health status since discharge? Improving   Nursing Interventions Nurse provided patient education   If the patient is a current smoker, are they able to teach back resources for cessation? Not a smoker   Is the patient/caregiver able to teach back the hierarchy of who to call/visit for symptoms/problems? PCP, Specialist, Home health nurse, Urgent Care, ED, 911 Yes   Is the patient/caregiver able to teach back signs and symptoms of worsening condition: Fever/chills, Shortness of breath, Chest pain   Is the patient/caregiver able to teach back importance of completing antibiotic course of treatment? Yes   Week 1 call completed? Yes   Wrap up additional comments Pt had a positive HIV test during admission, she stated she had just taken a home test a few days prior to this test and it was negative. Enc pt to find her a pcp and to f/u with the infectious disease clinic for re-testing.           ALTHEA HARPER - Registered Nurse

## 2022-08-24 ENCOUNTER — READMISSION MANAGEMENT (OUTPATIENT)
Dept: CALL CENTER | Facility: HOSPITAL | Age: 32
End: 2022-08-24

## 2022-08-24 NOTE — OUTREACH NOTE
COPD/PN Week 2 Survey    Flowsheet Row Responses   [facility] patient discharged from? [place]   Does the patient have one of the following disease processes/diagnoses(primary or secondary)? COPD/Pneumonia   Was the primary reason for admission: Pneumonia   Week 2 attempt successful? Yes   Call start time 0931   Call end time 0941   Meds reviewed with patient/caregiver? Yes   Is the patient having any side effects they believe may be caused by any medication additions or changes? No   Does the patient have all medications ordered at discharge? Yes   Is the patient taking all medications as directed (includes completed medication regime)? Yes   Medication comments States Biktarvy was $3000. [facility] has 3 week supply of Biktarvy. Encouraged to discuss Biktarvy cost with her PCP as soon as possible.   Comments regarding appointments Encouraged to reschedule appt with PCP as soon as possible to discuss Biktarvy cost, and possible alternatives.   Does the patient have a primary care provider?  Yes   Does the patient have an appointment with their PCP or specialist within 7 days of discharge? Yes   Comments regarding PCP Dhruv Reddy   Has the patient kept scheduled appointments due by today? No   What is preventing the patient from keeping their appointments? --  [States missed appt due to work.]   Nursing Interventions Advised to reschedule appointment   Has home health visited the patient within 72 hours of discharge? N/A   DME comments States still does not need the oxygen.   Pulse Ox monitoring None   Psychosocial issues? No   Did the patient receive a copy of their discharge instructions? Yes   Nursing interventions Reviewed instructions with patient   What is the patient's perception of their health status since discharge? Improving   Nursing Interventions Nurse provided patient education   Are the patient's immunizations up to date?  No   Nursing interventions Advised patient to discuss with provider  at next visit   If the patient is a current smoker, are they able to teach back resources for cessation? Not a smoker   Is the patient/caregiver able to teach back the hierarchy of who to call/visit for symptoms/problems? PCP, Specialist, Home health nurse, Urgent Care, ED, 911 Yes   Is the patient/caregiver able to teach back signs and symptoms of worsening condition: Fever/chills, Shortness of breath, Chest pain   Is the patient/caregiver able to teach back importance of completing antibiotic course of treatment? Yes   Week 2 call completed? Yes   Wrap up additional comments States is improving. Denies any fever/chills, chest pain or SOA. States will reschedule appt with PCP. Denies any needs today.          SOSA PITTS - Registered Nurse

## 2022-09-01 ENCOUNTER — READMISSION MANAGEMENT (OUTPATIENT)
Dept: CALL CENTER | Facility: HOSPITAL | Age: 32
End: 2022-09-01

## 2022-09-01 NOTE — OUTREACH NOTE
COPD/PN Week 3 Survey    Flowsheet Row Responses   [facility]? [place]   Does the patient have one of the following disease processes/diagnoses(primary or secondary)? Pneumonia   Week 3 attempt successful? No   Unsuccessful attempts Attempt 1   Discharge diagnosis Pneumocystis pneumonia           RADHA HARPER - Registered Nurse

## 2022-09-07 ENCOUNTER — TELEPHONE (OUTPATIENT)
Dept: INFECTIOUS DISEASES | Facility: CLINIC | Age: 32
End: 2022-09-07

## 2022-09-07 LAB
FUNGUS WND CULT: NORMAL
FUNGUS WND CULT: NORMAL

## 2022-09-07 NOTE — TELEPHONE ENCOUNTER
After receiving Dr. Reddy's approval for a video visit since patient unable to come into the office, I called patient back and left a voice message indicating the video visit 9/14/22 had been approved along w/ our office # to call for any questions.    ----- Message from Dhruv Reddy MD sent at 9/7/2022  9:47 AM EDT -----  Regarding: RE: Patient can only do a video visit  That will work, thank you  ----- Message -----  From: Lakisha Smith RN  Sent: 9/7/2022   8:37 AM EDT  To: Dhruv Reddy MD, #  Subject: Patient can only do a video visit                Patient Uri Mirza is unable to come to office for F/U visit due to work schedule but can do a video visit on your next available opening which is Wed 9/14/22 at 1pm. I went ahead & scheduled it but wanted to verify w/ you that it is ok for a video visit.   Thank you, Lakisha HENDRICKSON

## 2022-09-21 LAB
GENE ANALYSIS NARR RPT DOC: NORMAL
GENE ANALYSIS NARR RPT DOC: NORMAL
HIV RT+PR MUT DET ISLT: NORMAL
HIV SUSC PNL ISLT PHENOTYP: NORMAL
LABORATORY COMMENT REPORT: NORMAL
MYCOBACTERIUM SPEC CULT: NORMAL
MYCOBACTERIUM SPEC CULT: NORMAL
NIGHT BLUE STAIN TISS: NORMAL
NIGHT BLUE STAIN TISS: NORMAL
REF LAB TEST RESULTS: NORMAL
TEST PERFORMANCE INFO SPEC: NORMAL
TEST PERFORMANCE INFO SPEC: NORMAL

## 2022-11-01 NOTE — TELEPHONE ENCOUNTER
Please see attached refill request. I don't think she has been able to get into [facility] clinic yet.

## 2022-11-02 RX ORDER — BICTEGRAVIR SODIUM, EMTRICITABINE, AND TENOFOVIR ALAFENAMIDE FUMARATE 50; 200; 25 MG/1; MG/1; MG/1
TABLET ORAL
Qty: 30 TABLET | Refills: 1 | OUTPATIENT
Start: 2022-11-02

## (undated) DEVICE — SENSR O2 OXIMAX FNGR A/ 18IN NONSTR

## (undated) DEVICE — VITAL SIGNS™ JACKSON-REES CIRCUITS: Brand: VITAL SIGNS™

## (undated) DEVICE — SINGLE USE BIOPSY VALVE MAJ-210: Brand: SINGLE USE BIOPSY VALVE (STERILE)

## (undated) DEVICE — ADAPT SWVL FIBROPTIC BRONCH

## (undated) DEVICE — TUBING, SUCTION, 1/4" X 10', STRAIGHT: Brand: MEDLINE

## (undated) DEVICE — ADAPT CLN BIOGUARD AIR/H2O DISP

## (undated) DEVICE — SINGLE USE SUCTION VALVE MAJ-209: Brand: SINGLE USE SUCTION VALVE (STERILE)

## (undated) DEVICE — MSK AIRWY LARYNG LMA PILOT SZ4